# Patient Record
Sex: FEMALE | Race: WHITE | Employment: FULL TIME | URBAN - METROPOLITAN AREA
[De-identification: names, ages, dates, MRNs, and addresses within clinical notes are randomized per-mention and may not be internally consistent; named-entity substitution may affect disease eponyms.]

---

## 2022-10-10 LAB
C. TRACHOMATIS, EXTERNAL RESULT: NEGATIVE
N. GONORRHOEAE, EXTERNAL RESULT: NEGATIVE

## 2022-11-07 LAB
ABO, EXTERNAL RESULT: NORMAL
HEP B, EXTERNAL RESULT: NEGATIVE
HEPATITIS C ANTIBODY, EXTERNAL RESULT: NEGATIVE
HIV, EXTERNAL RESULT: NONREACTIVE
RH FACTOR, EXTERNAL RESULT: NEGATIVE
RPR, EXTERNAL RESULT: NON REACTIVE
RUBELLA TITER, EXTERNAL RESULT: NORMAL

## 2022-11-11 ENCOUNTER — APPOINTMENT (OUTPATIENT)
Dept: ULTRASOUND IMAGING | Age: 37
End: 2022-11-11
Payer: COMMERCIAL

## 2022-11-11 ENCOUNTER — HOSPITAL ENCOUNTER (EMERGENCY)
Age: 37
Discharge: HOME | End: 2022-11-12
Attending: EMERGENCY MEDICINE
Payer: COMMERCIAL

## 2022-11-11 DIAGNOSIS — B96.89 BACTERIAL VAGINOSIS IN PREGNANCY: ICD-10-CM

## 2022-11-11 DIAGNOSIS — O23.599 BACTERIAL VAGINOSIS IN PREGNANCY: ICD-10-CM

## 2022-11-11 DIAGNOSIS — Z29.13 NEED FOR RHOGAM DUE TO RH NEGATIVE MOTHER: ICD-10-CM

## 2022-11-11 DIAGNOSIS — O46.90 VAGINAL BLEEDING IN PREGNANCY: Primary | ICD-10-CM

## 2022-11-11 LAB
A/G RATIO: 1.4 (ref 1.1–2.2)
ABO/RH: NORMAL
ALBUMIN SERPL-MCNC: 4.2 G/DL (ref 3.4–5)
ALP BLD-CCNC: 65 U/L (ref 40–129)
ALT SERPL-CCNC: 8 U/L (ref 10–40)
ANION GAP SERPL CALCULATED.3IONS-SCNC: 13 MMOL/L (ref 3–16)
ANTIBODY SCREEN: NORMAL
AST SERPL-CCNC: 12 U/L (ref 15–37)
BACTERIA WET PREP: ABNORMAL
BACTERIA: ABNORMAL /HPF
BASOPHILS ABSOLUTE: 0.1 K/UL (ref 0–0.2)
BASOPHILS RELATIVE PERCENT: 0.3 %
BILIRUB SERPL-MCNC: <0.2 MG/DL (ref 0–1)
BILIRUBIN URINE: NEGATIVE
BLOOD, URINE: ABNORMAL
BUN BLDV-MCNC: 11 MG/DL (ref 7–20)
CALCIUM SERPL-MCNC: 9.4 MG/DL (ref 8.3–10.6)
CHLORIDE BLD-SCNC: 103 MMOL/L (ref 99–110)
CLARITY: CLEAR
CLUE CELLS: ABNORMAL
CO2: 22 MMOL/L (ref 21–32)
COLOR: YELLOW
CREAT SERPL-MCNC: <0.5 MG/DL (ref 0.6–1.1)
EOSINOPHILS ABSOLUTE: 0.1 K/UL (ref 0–0.6)
EOSINOPHILS RELATIVE PERCENT: 0.7 %
EPITHELIAL CELLS WET PREP: ABNORMAL
EPITHELIAL CELLS, UA: ABNORMAL /HPF (ref 0–5)
GFR SERPL CREATININE-BSD FRML MDRD: >60 ML/MIN/{1.73_M2}
GLUCOSE BLD-MCNC: 97 MG/DL (ref 70–99)
GLUCOSE URINE: NEGATIVE MG/DL
GONADOTROPIN, CHORIONIC (HCG) QUANT: NORMAL MIU/ML
HCT VFR BLD CALC: 38 % (ref 36–48)
HEMOGLOBIN: 12.8 G/DL (ref 12–16)
KETONES, URINE: ABNORMAL MG/DL
LEUKOCYTE ESTERASE, URINE: NEGATIVE
LYMPHOCYTES ABSOLUTE: 4.3 K/UL (ref 1–5.1)
LYMPHOCYTES RELATIVE PERCENT: 24.8 %
MCH RBC QN AUTO: 28.4 PG (ref 26–34)
MCHC RBC AUTO-ENTMCNC: 33.7 G/DL (ref 31–36)
MCV RBC AUTO: 84.3 FL (ref 80–100)
MICROSCOPIC EXAMINATION: YES
MONOCYTES ABSOLUTE: 0.6 K/UL (ref 0–1.3)
MONOCYTES RELATIVE PERCENT: 3.7 %
MUCUS: ABNORMAL /LPF
NEUTROPHILS ABSOLUTE: 12.3 K/UL (ref 1.7–7.7)
NEUTROPHILS RELATIVE PERCENT: 70.5 %
NITRITE, URINE: NEGATIVE
PDW BLD-RTO: 14.3 % (ref 12.4–15.4)
PH UA: 6 (ref 5–8)
PLATELET # BLD: 299 K/UL (ref 135–450)
PMV BLD AUTO: 7.7 FL (ref 5–10.5)
POTASSIUM REFLEX MAGNESIUM: 4.1 MMOL/L (ref 3.5–5.1)
PROTEIN UA: ABNORMAL MG/DL
RBC # BLD: 4.51 M/UL (ref 4–5.2)
RBC UA: ABNORMAL /HPF (ref 0–4)
RBC WET PREP: ABNORMAL
SODIUM BLD-SCNC: 138 MMOL/L (ref 136–145)
SOURCE WET PREP: ABNORMAL
SPECIFIC GRAVITY UA: >=1.03 (ref 1–1.03)
TOTAL PROTEIN: 7.2 G/DL (ref 6.4–8.2)
TRICHOMONAS PREP: ABNORMAL
URINE REFLEX TO CULTURE: ABNORMAL
URINE TYPE: ABNORMAL
UROBILINOGEN, URINE: 0.2 E.U./DL
WBC # BLD: 17.5 K/UL (ref 4–11)
WBC UA: ABNORMAL /HPF (ref 0–5)
WBC WET PREP: ABNORMAL
YEAST WET PREP: ABNORMAL

## 2022-11-11 PROCEDURE — 87210 SMEAR WET MOUNT SALINE/INK: CPT

## 2022-11-11 PROCEDURE — 80053 COMPREHEN METABOLIC PANEL: CPT

## 2022-11-11 PROCEDURE — 81001 URINALYSIS AUTO W/SCOPE: CPT

## 2022-11-11 PROCEDURE — 85025 COMPLETE CBC W/AUTO DIFF WBC: CPT

## 2022-11-11 PROCEDURE — 86901 BLOOD TYPING SEROLOGIC RH(D): CPT

## 2022-11-11 PROCEDURE — 87086 URINE CULTURE/COLONY COUNT: CPT

## 2022-11-11 PROCEDURE — 76817 TRANSVAGINAL US OBSTETRIC: CPT

## 2022-11-11 PROCEDURE — 36415 COLL VENOUS BLD VENIPUNCTURE: CPT

## 2022-11-11 PROCEDURE — 84702 CHORIONIC GONADOTROPIN TEST: CPT

## 2022-11-11 PROCEDURE — 86850 RBC ANTIBODY SCREEN: CPT

## 2022-11-11 PROCEDURE — 86900 BLOOD TYPING SEROLOGIC ABO: CPT

## 2022-11-11 PROCEDURE — 99284 EMERGENCY DEPT VISIT MOD MDM: CPT

## 2022-11-11 RX ORDER — METRONIDAZOLE 7.5 MG/G
GEL VAGINAL
Qty: 1 EACH | Refills: 0 | Status: SHIPPED | OUTPATIENT
Start: 2022-11-11 | End: 2022-11-18

## 2022-11-12 VITALS
BODY MASS INDEX: 30.61 KG/M2 | SYSTOLIC BLOOD PRESSURE: 103 MMHG | HEIGHT: 70 IN | RESPIRATION RATE: 16 BRPM | DIASTOLIC BLOOD PRESSURE: 70 MMHG | WEIGHT: 213.85 LBS | OXYGEN SATURATION: 99 % | HEART RATE: 60 BPM | TEMPERATURE: 97.7 F

## 2022-11-12 LAB — RHIG LOT NUMBER: NORMAL

## 2022-11-12 NOTE — DISCHARGE INSTRUCTIONS
The treatment for bacterial vaginosis was sent to the pharmacy: [facility], please go to the pharmacy to pick this up

## 2022-11-12 NOTE — ED PROVIDER NOTES
[address] [facility]      Pt Name: Heidi Rivera  MRN: [id number]  [place] 1985  Date of evaluation: 11/11/2022  Provider: Alonso Melendrez, [address] [place] [address]  Chief Complaint   Patient presents with    Vaginal Bleeding         This patient was turned over to me at [address] nurse practitioner. They were examined by me and I agree with the history and physical examination of  . After my evaluation of this patient, and review of the labs and imaging studies, they will be discharged. History reviewed. No pertinent past medical history. Vitals:    11/11/22 1912   BP: 116/73   Pulse: 75   Resp: 17   Temp: 98 °F (36.7 °C)   SpO2: 100%       MDM:  Heidi Rivera is a 44 y.o. female who presents with vaginal bleeding and is pregnant that has been present for few days. She had a ultrasound in this pregnancy that revealed an intrauterine pregnancy. She started bleeding recently. She was sent to this emergency department for RhoGAM due to her Rh- status and ultrasound. Physical exam reveals mild tenderness in the lower quadrants. Pelvic exam was performed by the nurse practitioner. Remainder of her counts were negative. Patient was given RhoGAM and a ABO and Rh were obtained because it was required by lab. Ultrasound revealed normal endometrial lining and normal uterus with no intrauterine pregnancy that was observed in the last ultrasound. Therefore, patient was discharged to follow with her obstetrician for further evaluation and treatment. She was treated for bacterial vaginosis. She was instructed return if any problems.     Vitals:    11/11/22 1912   BP: 116/73   Pulse: 75   Resp: 17   Temp: 98 °F (36.7 °C)   SpO2: 100%       Labs Reviewed   WET PREP, GENITAL - Abnormal; Notable for the following components:       Result Value    Clue Cells, Wet Prep 2+ (*)     All other components within normal limits   COMPREHENSIVE METABOLIC PANEL W/ REFLEX TO MG FOR LOW K - Abnormal; Notable for the following components:    Creatinine <0.5 (*)     ALT 8 (*)     AST 12 (*)     All other components within normal limits   CBC WITH AUTO DIFFERENTIAL - Abnormal; Notable for the following components:    WBC 17.5 (*)     Neutrophils Absolute 12.3 (*)     All other components within normal limits   URINALYSIS WITH REFLEX TO CULTURE - Abnormal; Notable for the following components:    Ketones, Urine TRACE (*)     Blood, Urine LARGE (*)     Protein, UA TRACE (*)     All other components within normal limits   MICROSCOPIC URINALYSIS - Abnormal; Notable for the following components:    Mucus, UA 1+ (*)     RBC, UA 5-10 (*)     Epithelial Cells, UA 6-10 (*)     Bacteria, UA 1+ (*)     All other components within normal limits   CULTURE, URINE   HCG, QUANTITATIVE, PREGNANCY   ABO/RH   ANTIBODY SCREEN   RHOGAM INJECTION ONLY       US OB TRANSVAGINAL    (Results Pending)         Pertinent Labs & Imaging studies reviewed. (See chart for details)    Patient remained stable in the ED. she was discharged to follow with her gynecologist and return to emergency department for any problems. The patient's blood pressure was not found to be elevated according to CMS/Medicare and the Affordable Care Act/ObamaCare criteria. See discharge instructions for specific medications, discharge information, and treatments. They were verbally instructed to return to emergency if any problems. Medications - No data to display    New Prescriptions    METRONIDAZOLE (METROGEL VAGINAL) 0.75 % VAGINAL GEL    Apply 1 applicator (5 g) intravaginally every night for 7 nights. (This chart has been completed using [address]. Although attempts have been made to ensure accuracy, words and/or phrases may not be transcribed as intended.)    Patient refused pain medicines at the time of their exam.    IMPRESSION(S):  1. Vaginal bleeding in pregnancy    2. Need for rhogam due to Rh negative mother    3.  Bacterial vaginosis in pregnancy             Ricki Bocanegra DO  11/11/22 3984

## 2022-11-12 NOTE — ED PROVIDER NOTES
[facility] [address]  Dept: [phone]  Loc: [phone]    EMERGENCY DEPARTMENT ENCOUNTER        This patient was not seen or evaluated by the attending physician at [address] [place] [address]. Will be seen and evaluated at [facility] by physician there. CHIEF COMPLAINT    Chief Complaint   Patient presents with    Vaginal Bleeding       HPI    Lou Adventist is a 44 y.o. female  Ab2 who presents with vaginal bleeding. Onset was today. Has only concerns for possible blood, she saw some spots on the toilet paper when she wiped and thinks that she Mitosol some blood in her urine. No dysuria. Does not even need a pad or tampon, just spotting. She is having some mild lower pelvic cramping. She has had multiple IUP is confirmed on ultrasound, sees [address] at [facility].  She is approximately 11 weeks pregnant. The patient's pain is extremely mild in nature. No pain currently. No nausea or vomiting. Came to the ED for further evaluation and treatment    REVIEW OF SYSTEMS    General: No fevers or chills  : No dysuria or flank pain  GI: No vomiting or diarrhea  Pulmonary: No difficulty breathing or cough  Neurologic: No loss of consciousness or syncope  See HPI for further details. All other systems reviewed and are negative. PAST MEDICAL & SURGICAL HISTORY    History reviewed. No pertinent past medical history. Past Surgical History:   Procedure Laterality Date    ABDOMEN SURGERY         CURRENT MEDICATIONS        ALLERGIES    Allergies   Allergen Reactions    Ciprofloxacin Itching    Penicillins Itching       FAMILY AND SOCIAL HISTORY    History reviewed. No pertinent family history.   Social History     Socioeconomic History    Marital status:      Spouse name: None    Number of children: None    Years of education: None    Highest education level: None   Tobacco Use    Smoking status: Never    Smokeless tobacco: Never   Substance and Sexual Activity    Alcohol use: Not Currently    Drug use: Never       PHYSICAL EXAM    VITAL SIGNS: /73   Pulse 75   Temp 98 °F (36.7 °C) (Oral)   Resp 17   Ht 5' 10\" (1.778 m)   Wt 213 lb 13.5 oz (97 kg)   SpO2 100%   BMI 30.68 kg/m²    Constitutional:  Well-developed, well-nourished, appears comfortable  Eyes:  Non-icteric sclera, no conjunctival injection   HENT:  Atraumatic, external nose normal.   NECK: Supple, no JVD   Respiratory:  No respiratory distress, normal breath sounds   Cardiovascular:  regular rate, no murmurs  GI:  Soft, no abdominal tenderness, bowel sounds unremarkable   : Pelvic exam performed with chaperone reveals: Normally developed external genitalia with no cutaneous or mucosal lesions or vesicles, normal appearing cervix, have a scant amount of bright red blood noted in the vaginal vault, no overt products of conception, did have 2 small, less than 0.5 cm in diameter blood clots in the vaginal vault. No active bleeding. Cervical os was closed. No cervical motion tenderness, no adnexal tenderness   Musculoskeletal: No edema, no deformities  Integument:  Nondiaphoretic skin, no obvious rashes  Neurologic: Awake and oriented, no slurred speech    RADIOLOGY/PROCEDURES    No orders to display       ED COURSE & MEDICAL DECISION MAKING    Pertinent Labs & Imaging studies reviewed and interpreted. (See chart for details)  See chart for details of medications given during the ED stay.     Vitals:    11/11/22 1912   BP: 116/73   Pulse: 75   Resp: 17   Temp: 98 °F (36.7 °C)   TempSrc: Oral   SpO2: 100%   Weight: 213 lb 13.5 oz (97 kg)   Height: 5' 10\" (1.778 m)       Differential diagnosis: ectopic pregnancy, molar pregnancy, miscarriage, hemorrhagic Shock, Rh incompatibility, UTI, placenta previa, other    CRITICAL CARE NOTE:  There was a high probability of clinically significant life-threatening deterioration of the patient's condition requiring my urgent intervention. Total critical care time was at least 15 minutes. This includes vital sign monitoring, pulse oximetry monitoring, telemetry monitoring, clinical response to the IV medications, reviewing the nursing notes, consultation time, dictation/documentation time, and interpretation of the labwork. This excludes any separately billable procedures performed. Patient is afebrile and nontoxic in appearance. Labs reveal leukocytosis of 17.5. Uncertain why this is elevated, she has been elevated before in the past as high as 15.7. She has no fever, no tachycardia or signs of infection. Wet prep was positive for 2+ clue cells consistent with BV. No trichomonas or yeast noted. I will E-scribed the MetroGel to the [address] on [facility] that we have listed for the patient    Her urine shows no signs of nitrite or leukocytes, the micro UA is still pending, and can she can follow-up the results when she gets to [facility] emergency department on the micro UA. She will need to follow-up at [facility] emergency department for the quantitative results, this is still pending at the time of her transfer as well. I explained to the patient that she will need a repeat quantitative done on Monday morning, was given the prescription for the repeat quantitative and will need to follow-up with her OB/GYN on the repeat quantitative test result. Metabolic panel unremarkable. Ultrasound is unavailable here at [address] [place] [address] as we are a freestanding ED. However she has had multiple confirmed IUP ultrasounds with her OB/GYN. I do not believe that a repeat emergent ultrasound is warranted as she has had confirmed IUP. Patient is Rh negative, seen per previous blood work on care everywhere. The ABO Rh is a send out. She will have to go to [facility] to obtain the RhoGAM and confirmation of the ABO Rh status.     Given that the patient will need to be transferred to [facility] for the RhoGAM I did call and expedite care for the patient. I spoke to Dr. Lorena Wood who accepted the patient for transfer. Patient will be transferred via private vehicle as she is hemodynamically stable. Instructed to go straight to [facility] emergency department. And she verbalized understanding of this. FINAL IMPRESSION    1. Vaginal bleeding in pregnancy    2. Need for rhogam due to Rh negative mother    3.  Bacterial vaginosis in pregnancy        PLAN  Transfer to [facility] for RhoGAM administration    (Please note that this note was completed with a voice recognition program.  Every attempt was made to edit the dictations, but inevitably there remain words that are mis-transcribed.)         DAYANA Young CNP  11/11/22 2001       DAYANA Young CNP  11/11/22 2017

## 2022-11-12 NOTE — ED NOTES
Report to [facility].  Patient to go over to [facility] via private car, instructed not to eat or drink anything prior to arrival.      Theodora Archer RN  11/11/22 1956

## 2022-11-13 LAB — URINE CULTURE, ROUTINE: NORMAL

## 2022-11-13 NOTE — ED PROVIDER NOTES
Emergency Department Encounter  Location: [address] [place] [address]    Patient: Ileana Lema  MRN: [id number]  : 1985  Date of evaluation: 2022  ED Provider: Michelle Murdock MD      Ileana Lema was initially evaluated by Ms. Álvarez and Dr. Pham Morales. Please see his/her initial documentation for details of the patient's initial ED presentation, physical exam and completed studies. In brief, Ileana Lema is a 44 y.o. female that presented to the emergency department for vaginal bleeding in pregnancy, workup and evaluation as below and documented by the prior providers. Called to the pt's room to discuss imaging findings by nursing.       I have reviewed and interpreted all of the currently available lab results and diagnostics from this visit:  Results for orders placed or performed during the hospital encounter of 22   Wet prep, genital    Specimen: Vaginal   Result Value Ref Range    Trichomonas Prep None Seen     Yeast, Wet Prep None Seen     Clue Cells, Wet Prep 2+ (A)     WBC, Wet Prep 1+     RBC, Wet Prep 3+     Epi Cells 2+     Bacteria 4+     Source Wet Prep Vaginal    CMP w/ Reflex to MG   Result Value Ref Range    Sodium 138 136 - 145 mmol/L    Potassium reflex Magnesium 4.1 3.5 - 5.1 mmol/L    Chloride 103 99 - 110 mmol/L    CO2 22 21 - 32 mmol/L    Anion Gap 13 3 - 16    Glucose 97 70 - 99 mg/dL    BUN 11 7 - 20 mg/dL    Creatinine <0.5 (L) 0.6 - 1.1 mg/dL    Est, Glom Filt Rate >60 >60    Calcium 9.4 8.3 - 10.6 mg/dL    Total Protein 7.2 6.4 - 8.2 g/dL    Albumin 4.2 3.4 - 5.0 g/dL    Albumin/Globulin Ratio 1.4 1.1 - 2.2    Total Bilirubin <0.2 0.0 - 1.0 mg/dL    Alkaline Phosphatase 65 40 - 129 U/L    ALT 8 (L) 10 - 40 U/L    AST 12 (L) 15 - 37 U/L   CBC with Auto Differential   Result Value Ref Range    WBC 17.5 (H) 4.0 - 11.0 K/uL    RBC 4.51 4.00 - 5.20 M/uL    Hemoglobin 12.8 12.0 - 16.0 g/dL    Hematocrit 38.0 36.0 - 48.0 %    MCV 84.3 80.0 - 100.0 fL MCH 28.4 26.0 - 34.0 pg    MCHC 33.7 31.0 - 36.0 g/dL    RDW 14.3 12.4 - 15.4 %    Platelets 492 168 - 244 K/uL    MPV 7.7 5.0 - 10.5 fL    Neutrophils % 70.5 %    Lymphocytes % 24.8 %    Monocytes % 3.7 %    Eosinophils % 0.7 %    Basophils % 0.3 %    Neutrophils Absolute 12.3 (H) 1.7 - 7.7 K/uL    Lymphocytes Absolute 4.3 1.0 - 5.1 K/uL    Monocytes Absolute 0.6 0.0 - 1.3 K/uL    Eosinophils Absolute 0.1 0.0 - 0.6 K/uL    Basophils Absolute 0.1 0.0 - 0.2 K/uL   Urinalysis with Reflex to Culture    Specimen: Urine   Result Value Ref Range    Color, UA Yellow Straw/Yellow    Clarity, UA Clear Clear    Glucose, Ur Negative Negative mg/dL    Bilirubin Urine Negative Negative    Ketones, Urine TRACE (A) Negative mg/dL    Specific Gravity, UA >=1.030 1.005 - 1.030    Blood, Urine LARGE (A) Negative    pH, UA 6.0 5.0 - 8.0    Protein, UA TRACE (A) Negative mg/dL    Urobilinogen, Urine 0.2 <2.0 E.U./dL    Nitrite, Urine Negative Negative    Leukocyte Esterase, Urine Negative Negative    Microscopic Examination YES     Urine Type NotGiven     Urine Reflex to Culture Not Indicated    hCG, quantitative, pregnancy   Result Value Ref Range    hCG Quant 28185.1 <5.0 mIU/mL   Microscopic Urinalysis   Result Value Ref Range    Mucus, UA 1+ (A) None Seen /LPF    WBC, UA 3-5 0 - 5 /HPF    RBC, UA 5-10 (A) 0 - 4 /HPF    Epithelial Cells, UA 6-10 (A) 0 - 5 /HPF    Bacteria, UA 1+ (A) None Seen /HPF   RHOGAM INJECTION ONLY   Result Value Ref Range    RHIG LOT NUMBER       RhImmuneGlobulin    [id number]          issued       1 vial  11/12/22 11:15   ABO/RH   Result Value Ref Range    ABO/Rh A NEG    ANTIBODY SCREEN   Result Value Ref Range    Antibody Screen NEG      US OB TRANSVAGINAL    Result Date: 11/11/2022  EXAMINATION: FIRST TRIMESTER OBSTETRIC ULTRASOUND 11/11/2022 TECHNIQUE: Transvaginal first trimester obstetric pelvic duplex ultrasound was performed with real-time imaging, color flow Doppler imaging, and spectral analysis. COMPARISON: None HISTORY: ORDERING SYSTEM PROVIDED HISTORY: Pregnant and Pelvic Pain TECHNOLOGIST PROVIDED HISTORY: Reason for exam:->Pregnant and Pelvic Pain FINDINGS: Uterus: The uterus measures 12.9 x 9.1 x 7.4 cm. No focal myometrial abnormality. A few nabothian cysts are present in the cervix. Gestational Sac(s):  Single normal appearing gestational sac. No evidence of subchorionic hemorrhage. Yolk Sac:  Not Seen Fetal Pole:  Single fetal pole Crown Rump Length:  4.4 cm Fetal Heart Rate:  157 beats per minute Right ovary: The right ovary was not seen. Left ovary: The left ovary was not seen. Free fluid: No pelvic free fluid identified. Measurements: Estimated gestational age by current ultrasound: 5 weeks, 2 days Estimated gestational by LMP/prior ultrasound: 10 weeks, 6 days Estimated Due Date: 05/31/2023     Single live intrauterine gestation with an estimated sonographic age of 5 weeks, 2 days. No acute intrapelvic findings. Nonvisualization of the ovaries. Final ED Course and MDM: In brief, Claudeen Blonder is a 44 y.o. female whose care was signed out to me by the outgoing provider. In brief, I was asked by nursing to discuss with the pt her actual US results as she had been inadvertently given results of an ultrasound she had from 2021 by the prior team.  The wrong report was faxed over during downtime. I personally reviewed the patient's images and visualized a live IUP. Radiology then faxed over the correct report which corroborated my interpretation as well. Pt was understandably upset about the initial report that she was not pregnant. I provided an apology and relayed to her the reassuring actual US findings from today. She was relieved. Os was closed per pelvic performed earlier. I did indicate that 1st trimester bleeding is common but is associated with some increase in miscarriage risk and recommended pelvic rest.  Pt voiced understanding.   Bleeding nearly resolved currently, pt without pain, tolerating PO.  HDS. Stable for and amenable to d/c home. Given RhoGam as she is Rh negative. Given d/c instructions and return precautions, pt voices understanding. D/c home, ambulated steadily from the ED. I, Dr. Antonieta Quintanilla, am the primary clinician of record on the case. Final Impression      1. Vaginal bleeding in pregnancy    2. Need for rhogam due to Rh negative mother    3.  Bacterial vaginosis in pregnancy        DISPOSITION Decision To Discharge 11/12/2022 01:15:54 AM     (Please note that portions of this note may have been completed with a voice recognition program. Efforts were made to edit the dictations but occasionally words are mis-transcribed.)    Corey Augustin MD  [facility] MD Yenni  11/12/22 3454

## 2022-11-17 ENCOUNTER — HOSPITAL ENCOUNTER (EMERGENCY)
Age: 37
Discharge: HOME | End: 2022-11-17
Payer: COMMERCIAL

## 2022-11-17 VITALS
RESPIRATION RATE: 16 BRPM | OXYGEN SATURATION: 100 % | DIASTOLIC BLOOD PRESSURE: 78 MMHG | HEIGHT: 70 IN | TEMPERATURE: 98.9 F | SYSTOLIC BLOOD PRESSURE: 116 MMHG | BODY MASS INDEX: 30.61 KG/M2 | WEIGHT: 213.85 LBS | HEART RATE: 74 BPM

## 2022-11-17 DIAGNOSIS — O46.90 VAGINAL BLEEDING IN PREGNANCY: Primary | ICD-10-CM

## 2022-11-17 LAB
ANION GAP SERPL CALCULATED.3IONS-SCNC: 10 MMOL/L (ref 3–16)
BASOPHILS ABSOLUTE: 0.1 K/UL (ref 0–0.2)
BASOPHILS RELATIVE PERCENT: 0.5 %
BUN BLDV-MCNC: 9 MG/DL (ref 7–20)
CALCIUM SERPL-MCNC: 9.1 MG/DL (ref 8.3–10.6)
CHLORIDE BLD-SCNC: 105 MMOL/L (ref 99–110)
CO2: 22 MMOL/L (ref 21–32)
CREAT SERPL-MCNC: 0.6 MG/DL (ref 0.6–1.1)
EOSINOPHILS ABSOLUTE: 0.1 K/UL (ref 0–0.6)
EOSINOPHILS RELATIVE PERCENT: 0.9 %
GFR SERPL CREATININE-BSD FRML MDRD: >60 ML/MIN/{1.73_M2}
GLUCOSE BLD-MCNC: 110 MG/DL (ref 70–99)
GONADOTROPIN, CHORIONIC (HCG) QUANT: NORMAL MIU/ML
HCT VFR BLD CALC: 39.2 % (ref 36–48)
HEMOGLOBIN: 12.6 G/DL (ref 12–16)
LYMPHOCYTES ABSOLUTE: 2.9 K/UL (ref 1–5.1)
LYMPHOCYTES RELATIVE PERCENT: 23.7 %
MCH RBC QN AUTO: 27.7 PG (ref 26–34)
MCHC RBC AUTO-ENTMCNC: 32.1 G/DL (ref 31–36)
MCV RBC AUTO: 86.5 FL (ref 80–100)
MONOCYTES ABSOLUTE: 0.6 K/UL (ref 0–1.3)
MONOCYTES RELATIVE PERCENT: 4.9 %
NEUTROPHILS ABSOLUTE: 8.5 K/UL (ref 1.7–7.7)
NEUTROPHILS RELATIVE PERCENT: 70 %
PDW BLD-RTO: 14.2 % (ref 12.4–15.4)
PLATELET # BLD: 290 K/UL (ref 135–450)
PMV BLD AUTO: 7.7 FL (ref 5–10.5)
POTASSIUM REFLEX MAGNESIUM: 4.3 MMOL/L (ref 3.5–5.1)
RBC # BLD: 4.53 M/UL (ref 4–5.2)
SODIUM BLD-SCNC: 137 MMOL/L (ref 136–145)
WBC # BLD: 12.1 K/UL (ref 4–11)

## 2022-11-17 PROCEDURE — 36415 COLL VENOUS BLD VENIPUNCTURE: CPT

## 2022-11-17 PROCEDURE — 99283 EMERGENCY DEPT VISIT LOW MDM: CPT

## 2022-11-17 PROCEDURE — 88305 TISSUE EXAM BY PATHOLOGIST: CPT

## 2022-11-17 PROCEDURE — 85025 COMPLETE CBC W/AUTO DIFF WBC: CPT

## 2022-11-17 PROCEDURE — 84702 CHORIONIC GONADOTROPIN TEST: CPT

## 2022-11-17 PROCEDURE — 80048 BASIC METABOLIC PNL TOTAL CA: CPT

## 2022-11-17 ASSESSMENT — ENCOUNTER SYMPTOMS
SHORTNESS OF BREATH: 0
ABDOMINAL PAIN: 1
COLOR CHANGE: 0

## 2022-11-17 ASSESSMENT — PAIN - FUNCTIONAL ASSESSMENT: PAIN_FUNCTIONAL_ASSESSMENT: NONE - DENIES PAIN

## 2022-11-17 ASSESSMENT — LIFESTYLE VARIABLES: HOW OFTEN DO YOU HAVE A DRINK CONTAINING ALCOHOL: NEVER

## 2022-11-17 NOTE — ED NOTES
Fetal heart tones found, [facility] [address] [facility], RN  11/17/22 126

## 2022-11-17 NOTE — ED PROVIDER NOTES
Patient     [address] [facility]      Pt Name: Alok Wilder  MRN: [id number]  [place] 1985  Date of evaluation: 2022  Provider: NATALI Valenzuela    This patient was not seen and evaluated by the attending physician No att. providers found. CHIEF COMPLAINT       Chief Complaint   Patient presents with    Vaginal Bleeding     Witth pregnacy       CRITICAL CARE TIME   I performed a total Critical Care time of 15 minutes, excluding separately reportable procedures. There was a high probability of clinically significant/life threatening deterioration in the patient's condition which required my urgent intervention. Not limited to multiple reexaminations, discussions with attending physician and consultants. HISTORY OF PRESENT ILLNESS  (Location/Symptom, Timing/Onset, Context/Setting, Quality, Duration, Modifying Factors, Severity.)   Alok Wilder is a 44 y.o. female who presents to the emergency department accompanied by her  complaining of vaginal bleeding in pregnancy. She was seen in our emergency department 5-6 days ago and had some spotting at that time she tells me that it stopped and Monday she was seen by her and at 8year-old and had fetal heart tones. She states that the bleeding started back just prior to arrival after having intercourse. She tells me that it was \"pouring out of her\" and that she noticed clots. She is  with last menstrual period 827 approximately 12 weeks pregnant. She complains of some mild lower abdominal cramping. Nursing Notes were reviewed and I agree. REVIEW OF SYSTEMS    (2-9 systems for level 4, 10 or more for level 5)     Review of Systems   Constitutional:  Negative for fever. Respiratory:  Negative for shortness of breath. Cardiovascular:  Negative for chest pain. Gastrointestinal:  Positive for abdominal pain. Genitourinary:  Positive for vaginal bleeding.    Skin: Negative for color change, rash and wound. Neurological:  Negative for weakness. Psychiatric/Behavioral:  Negative for agitation, behavioral problems and confusion. Except as noted above the remainder of the review of systems was reviewed and negative. PAST MEDICAL HISTORY   History reviewed. No pertinent past medical history. SURGICAL HISTORY           Procedure Laterality Date    ABDOMEN SURGERY         CURRENT MEDICATIONS       Discharge Medication List as of 11/17/2022  3:17 AM        CONTINUE these medications which have NOT CHANGED    Details   metroNIDAZOLE (METROGEL VAGINAL) 0.75 % vaginal gel Apply 1 applicator (5 g) intravaginally every night for 7 nights., Disp-1 each, R-0, Normal             ALLERGIES     Ciprofloxacin and Penicillins    FAMILY HISTORY     History reviewed. No pertinent family history. No family status information on file. SOCIAL HISTORY      reports that she has never smoked. She has never used smokeless tobacco. She reports that she does not currently use alcohol. She reports that she does not use drugs. PHYSICAL EXAM    (up to 7 for level 4, 8 or more for level 5)     ED Triage Vitals [11/17/22 0115]   BP Temp Temp Source Heart Rate Resp SpO2 Height Weight   116/78 98.9 °F (37.2 °C) Oral 74 18 100 % 5' 10\" (1.778 m) 213 lb 13.5 oz (97 kg)       Physical Exam  Vitals and nursing note reviewed. Constitutional:       Appearance: Normal appearance. HENT:      Head: Normocephalic and atraumatic. Eyes:      Pupils: Pupils are equal, round, and reactive to light. Cardiovascular:      Rate and Rhythm: Normal rate. Pulmonary:      Effort: Pulmonary effort is normal. No respiratory distress. Abdominal:      Tenderness: There is no abdominal tenderness. There is no guarding. Genitourinary:     Comments: External genitalia reveals no swelling, erythema, or external lesions. No rash or ulcer noted. Speculum was inserted without difficulty.   Large blood clot in the vaginal vault, cervical os appears closed. Scant blood otherwise. Performed with chaperone. Musculoskeletal:         General: Normal range of motion. Cervical back: Normal range of motion. Skin:     General: Skin is warm. Neurological:      General: No focal deficit present. Mental Status: She is alert and oriented to person, place, and time. Psychiatric:         Mood and Affect: Mood normal.         Behavior: Behavior normal.       DIAGNOSTIC RESULTS     NONE    LABS:  Labs Reviewed   CBC WITH AUTO DIFFERENTIAL - Abnormal; Notable for the following components:       Result Value    WBC 12.1 (*)     Neutrophils Absolute 8.5 (*)     All other components within normal limits   BASIC METABOLIC PANEL W/ REFLEX TO MG FOR LOW K - Abnormal; Notable for the following components:    Glucose 110 (*)     All other components within normal limits   HCG, QUANTITATIVE, PREGNANCY   SURGICAL PATHOLOGY       All other labs were within normal range or not returned as of this dictation. EMERGENCY DEPARTMENT COURSE and DIFFERENTIAL DIAGNOSIS/MDM:   Vitals:    Vitals:    11/17/22 0115 11/17/22 0128   BP: 116/78 116/78   Pulse: 74 74   Resp: 18 16   Temp: 98.9 °F (37.2 °C) 98.9 °F (37.2 °C)   TempSrc: Oral Oral   SpO2: 100% 100%   Weight: 213 lb 13.5 oz (97 kg) 213 lb 13.5 oz (97 kg)   Height: 5' 10\" (1.778 m) 5' 10\" (1.778 m)     I discussed with Michelle Salazar and/or family the exam results, diagnosis, care, prognosis, reasons to return and the importance of follow up. Patient and/or family is in full agreement with plan and all questions have been answered. Specific discharge instructions explained, including reasons to return to the emergency department. Michelle Salazar is well appearing, non-toxic, and afebrile at the time of discharge. Patient does have positive fetal heart tones at 159. Cervical os appears closed.   There was a large clot in the vaginal vault which removed and then there was very scant of blood. Her hemoglobin is stable at 12.6 previously 12.8 last week. She is going to follow-up with her OB/GYN tomorrow pelvic rest.  Return for new, worsening or other concerns. She had ultrasound confirming intrauterine pregnancy last week and received RhoGAM at that time. I estimate there is LOW risk for ACUTE APPENDICITIS, BOWEL OBSTRUCTION, CHOLECYSTITIS, DIVERTICULITIS, INCARCERATED HERNIA, PANCREATITIS, PELVIC INFLAMMATORY DISEASE, OVARIAN TORSION, PERFORATED BOWEL,  BOWEL ISCHEMIA, CARDIAC ISCHEMIA, ECTOPIC PREGNANCY, or TUBO-OVARIAN ABSCESS, thus I consider the discharge disposition reasonable. Also, there is no evidence or peritonitis, sepsis, or toxicity. CONSULTS:  None    PROCEDURES:  Procedures      FINAL IMPRESSION      1.  Vaginal bleeding in pregnancy          DISPOSITION/PLAN   DISPOSITION Decision To Discharge 11/17/2022 03:14:36 AM      PATIENT REFERRED TO:  Connor Hunter MD  [address] [place] [address] [address] [place] [address]  [phone]    Call in 1 day  For follow up      [address]:  Discharge Medication List as of 11/17/2022  3:17 AM          (Please note that portions of this note were completed with a voice recognition program.  Efforts were made to edit the dictations but occasionally words are mis-transcribed.)    Darra Fothergill, [place], [address]  11/17/22 1986

## 2022-11-17 NOTE — LETTER
[facility] Emergency Department  [address] [address]  Phone: [phone]               November 17, 2022    To Whom It May Concern:    Mr. Conchis Maynard was in our emergency department on 11/17/2022. He may return to work on 11/18/22.       Sincerely,       NATALI Fuentes         Signature:__________________________________

## 2022-11-17 NOTE — ED TRIAGE NOTES
Spotting on Friday came and had ultrasound then went to  On Monday and heard baby's heartbeat then tonight started gushing blood

## 2023-02-19 ENCOUNTER — HOSPITAL ENCOUNTER (EMERGENCY)
Age: 38
Discharge: HOME | End: 2023-02-19
Attending: EMERGENCY MEDICINE
Payer: COMMERCIAL

## 2023-02-19 VITALS
OXYGEN SATURATION: 100 % | TEMPERATURE: 97 F | RESPIRATION RATE: 16 BRPM | BODY MASS INDEX: 33.27 KG/M2 | HEART RATE: 80 BPM | SYSTOLIC BLOOD PRESSURE: 124 MMHG | WEIGHT: 232.37 LBS | DIASTOLIC BLOOD PRESSURE: 86 MMHG | HEIGHT: 70 IN

## 2023-02-19 DIAGNOSIS — R07.89 CHEST WALL PAIN: Primary | ICD-10-CM

## 2023-02-19 LAB
A/G RATIO: 1.1 (ref 1.1–2.2)
ALBUMIN SERPL-MCNC: 3.3 G/DL (ref 3.4–5)
ALP BLD-CCNC: 59 U/L (ref 40–129)
ALT SERPL-CCNC: 7 U/L (ref 10–40)
ANION GAP SERPL CALCULATED.3IONS-SCNC: 13 MMOL/L (ref 3–16)
AST SERPL-CCNC: 10 U/L (ref 15–37)
BASOPHILS ABSOLUTE: 0.1 K/UL (ref 0–0.2)
BASOPHILS RELATIVE PERCENT: 0.3 %
BILIRUB SERPL-MCNC: <0.2 MG/DL (ref 0–1)
BUN BLDV-MCNC: 8 MG/DL (ref 7–20)
CALCIUM SERPL-MCNC: 8.8 MG/DL (ref 8.3–10.6)
CHLORIDE BLD-SCNC: 102 MMOL/L (ref 99–110)
CO2: 20 MMOL/L (ref 21–32)
CREAT SERPL-MCNC: <0.5 MG/DL (ref 0.6–1.1)
EOSINOPHILS ABSOLUTE: 0.2 K/UL (ref 0–0.6)
EOSINOPHILS RELATIVE PERCENT: 0.9 %
GFR SERPL CREATININE-BSD FRML MDRD: >60 ML/MIN/{1.73_M2}
GLUCOSE BLD-MCNC: 97 MG/DL (ref 70–99)
HCT VFR BLD CALC: 34.4 % (ref 36–48)
HEMOGLOBIN: 11.5 G/DL (ref 12–16)
LYMPHOCYTES ABSOLUTE: 4.1 K/UL (ref 1–5.1)
LYMPHOCYTES RELATIVE PERCENT: 23.5 %
MCH RBC QN AUTO: 28.2 PG (ref 26–34)
MCHC RBC AUTO-ENTMCNC: 33.6 G/DL (ref 31–36)
MCV RBC AUTO: 84.1 FL (ref 80–100)
MONOCYTES ABSOLUTE: 0.8 K/UL (ref 0–1.3)
MONOCYTES RELATIVE PERCENT: 4.3 %
NEUTROPHILS ABSOLUTE: 12.4 K/UL (ref 1.7–7.7)
NEUTROPHILS RELATIVE PERCENT: 71 %
PDW BLD-RTO: 13.4 % (ref 12.4–15.4)
PLATELET # BLD: 325 K/UL (ref 135–450)
PMV BLD AUTO: 8.3 FL (ref 5–10.5)
POTASSIUM REFLEX MAGNESIUM: 3.8 MMOL/L (ref 3.5–5.1)
RAPID INFLUENZA  B AGN: NEGATIVE
RAPID INFLUENZA A AGN: NEGATIVE
RBC # BLD: 4.09 M/UL (ref 4–5.2)
S PYO AG THROAT QL: NEGATIVE
SARS-COV-2, NAAT: NOT DETECTED
SODIUM BLD-SCNC: 135 MMOL/L (ref 136–145)
TOTAL PROTEIN: 6.3 G/DL (ref 6.4–8.2)
TROPONIN: <0.01 NG/ML
WBC # BLD: 17.5 K/UL (ref 4–11)

## 2023-02-19 PROCEDURE — 87804 INFLUENZA ASSAY W/OPTIC: CPT

## 2023-02-19 PROCEDURE — 87635 SARS-COV-2 COVID-19 AMP PRB: CPT

## 2023-02-19 PROCEDURE — 80053 COMPREHEN METABOLIC PANEL: CPT

## 2023-02-19 PROCEDURE — 85025 COMPLETE CBC W/AUTO DIFF WBC: CPT

## 2023-02-19 PROCEDURE — 87880 STREP A ASSAY W/OPTIC: CPT

## 2023-02-19 PROCEDURE — 99284 EMERGENCY DEPT VISIT MOD MDM: CPT

## 2023-02-19 PROCEDURE — 87081 CULTURE SCREEN ONLY: CPT

## 2023-02-19 PROCEDURE — 84484 ASSAY OF TROPONIN QUANT: CPT

## 2023-02-19 PROCEDURE — 2580000003 HC RX 258: Performed by: EMERGENCY MEDICINE

## 2023-02-19 RX ORDER — 0.9 % SODIUM CHLORIDE 0.9 %
1000 INTRAVENOUS SOLUTION INTRAVENOUS ONCE
Status: COMPLETED | OUTPATIENT
Start: 2023-02-19 | End: 2023-02-19

## 2023-02-19 RX ADMIN — SODIUM CHLORIDE 1000 ML: 9 INJECTION, SOLUTION INTRAVENOUS at 01:34

## 2023-02-19 ASSESSMENT — PAIN DESCRIPTION - LOCATION: LOCATION: CHEST;NECK

## 2023-02-19 ASSESSMENT — PAIN DESCRIPTION - ONSET: ONSET: SUDDEN

## 2023-02-19 ASSESSMENT — PAIN - FUNCTIONAL ASSESSMENT
PAIN_FUNCTIONAL_ASSESSMENT: 0-10
PAIN_FUNCTIONAL_ASSESSMENT: 0-10

## 2023-02-19 ASSESSMENT — PAIN DESCRIPTION - PAIN TYPE: TYPE: ACUTE PAIN

## 2023-02-19 ASSESSMENT — PAIN DESCRIPTION - FREQUENCY: FREQUENCY: CONTINUOUS

## 2023-02-19 ASSESSMENT — PAIN DESCRIPTION - DESCRIPTORS: DESCRIPTORS: CRUSHING;HEAVINESS

## 2023-02-19 ASSESSMENT — PAIN SCALES - GENERAL
PAINLEVEL_OUTOF10: 8
PAINLEVEL_OUTOF10: 4

## 2023-02-19 NOTE — ED TRIAGE NOTES
Mushtaq Durán is a 44 y.o. female   Brought to the ED by her , reports chest and neck pain when breathing. Sudden onset after eating shrimp for dinner. Pt is 26 weeks pregnant. Airway patent.

## 2023-02-19 NOTE — ED PROVIDER NOTES
[facility] EMERGENCY DEPARTMENT  EMERGENCY DEPARTMENT ENCOUNTER      Pt Name: Priya Lindquist  MRN: [id number]  Birthdate 1985  Date of evaluation: 2/19/2023  Provider: JESS MARINO DO    CHIEF COMPLAINT  Chief Complaint   Patient presents with    Chest Pain     Pt is experiencing chest pain with sudden onset after eating shrimp for dinner. Pt is 7 months.          This patient is at risk for a communicable infection.  Therefore, personal protection equipment consisting of a mask was worn for the exam.    HPI  Priya Lindquist is a 37 y.o. female who presents with Complaining chest pain after eating shrimp for dinner.  Patient is 26 weeks pregnant.  She denies any fevers or chills.  She denies any nausea or vomiting.  Nothing makes it better or worse.  She describes it as moderate.  She denies any radiation of pain.  She states it radiates into her neck.  She states she has eaten shrimp and shellfish in the past without any problems.  ?  REVIEW OF SYSTEMS  All systems negative except as noted in the HPI.  Reviewed Nurses' notes and concur.    Patient's last menstrual period was 08/27/2022 (exact date).    PAST MEDICAL HISTORY  No past medical history on file.    FAMILY HISTORY  No family history on file.    SOCIAL HISTORY   reports that she has never smoked. She has never used smokeless tobacco. She reports that she does not currently use alcohol. She reports that she does not use drugs.    SURGICAL HISTORY  Past Surgical History:   Procedure Laterality Date    ABDOMEN SURGERY         CURRENT MEDICATIONS      ALLERGIES  Allergies   Allergen Reactions    Ciprofloxacin Itching    Penicillins Itching       WELLS Criteria are negative for pulmonary embolus.  Therefore, I do not feel the risk of radiation outweighs the benefits at this time.  ?  PHYSICAL EXAM  VITAL SIGNS: /86   Pulse 80   Temp 97 °F (36.1 °C) (Oral)   Resp 16   Ht 5' 10\" (1.778 m)   Wt 232 lb 5.8 oz (105.4 kg)   LMP  08/27/2022 (Exact Date)   SpO2 100%   BMI 33.34 kg/m²   Constitutional: Well-developed, well-nourished, appears normal, nontoxic, activity: Resting comfortably on the cart, no obvious pain, speaking full sentences, does not appear ill or toxic. HENT: Normocephalic, Atraumatic, Bilateral ears are normal, Oropharynx moist, No oral exudates, Nose normal.  Eyes: PERRLA, EOMI, Conjunctiva normal, No discharge. No scleral icterus. Neck: Normal range of motion, No tenderness, Supple,  Lymphatic: No lymphadenopathy noted. Cardiovascular: normal heart rate, normal rhythm, no murmurs, no clicks, no rubs, no gallops  Thorax & Lungs: normal breath sounds, no respiratory distress, no wheezing, no rales, no rhonchi, chest pain is reproducible along the left and right sternal borders. Abdomen: Soft, no tender with no guarding, no rebound, no rigidity; no distension, no masses, no pulsatile masses, no hepatosplenomegaly, normal bowel sounds, patient has gravid uterus consistent with dates. Skin: Warm, Dry, No erythema, No rash. Extremities: No edema, No tenderness, No cyanosis, No clubbing. No amputationseconds. Musculoskeletal: Good range of motion in all major joints, No tenderness to palpation or major deformities noted. Neurologic: Alert & oriented x 3  Psychiatric: Affect normal, Mood normal.    COURSE & MEDICAL DECISION MAKING  Pertinent Labs, EKG, & Imaging studies reviewed.  (See chart for details)    LABORATORY  Labs Reviewed   CBC WITH AUTO DIFFERENTIAL - Abnormal; Notable for the following components:       Result Value    WBC 17.5 (*)     Hemoglobin 11.5 (*)     Hematocrit 34.4 (*)     Neutrophils Absolute 12.4 (*)     All other components within normal limits   COMPREHENSIVE METABOLIC PANEL W/ REFLEX TO MG FOR LOW K - Abnormal; Notable for the following components:    Sodium 135 (*)     CO2 20 (*)     Creatinine <0.5 (*)     Total Protein 6.3 (*)     Albumin 3.3 (*)     ALT 7 (*)     AST 10 (*)     All other components within normal limits   STREP SCREEN GROUP A THROAT   COVID-19, RAPID   RAPID INFLUENZA A/B ANTIGENS   CULTURE, BETA STREP CONFIRM PLATES    Narrative:     ORDER#: [id number]                          ORDERED BY: Desi Weston  SOURCE: Throat                             COLLECTED:  02/19/23 01:36  ANTIBIOTICS AT PHANI.:                      RECEIVED :  02/19/23 02:02   TROPONIN     EKG 12 Lead  Order: [id number]  Status: Final result    Visible to patient: No (not released)    Next appt: None    0 Result Notes  Component Ref Range & Units 10/11/21 1851    Ventricular Rate BPM 85    Atrial Rate BPM 85    P-R Interval ms 142    QRS Duration ms 86    Q-T Interval ms 346    QTc Calculation (Bazett) ms 411    P Axis degrees 22    R Axis degrees 7    T Axis degrees -14    Diagnosis  Normal sinus rhythmModerate voltage criteria for LVH, may be normal variantT wave abnormality, consider inferior ischemiaAbnormal ECGNo previous ECGs availableConfirmed by PAPO WELLS, Joy Velasquez ([id number]) on 10/12/2021 7:24:24 AM   Resulting Agency  [facility]      Patient has voltage criteria for LVH. I feel that T wave inversion is secondary to early repolarization abnormalities. RADIOLOGY/PROCEDURES  I personally reviewed the images for this case. No orders to display        Vitals:    02/19/23 0115 02/19/23 0126 02/19/23 0503   BP: 105/62  124/86   Pulse: 82  80   Resp: 18  16   Temp: 97 °F (36.1 °C)  97 °F (36.1 °C)   TempSrc: Oral  Oral   SpO2: 100%     Weight:  232 lb 5.8 oz (105.4 kg)    Height:  5' 10\" (1.778 m)        Medications   0.9 % sodium chloride bolus (0 mLs IntraVENous Stopped 2/19/23 0234)       There are no discharge medications for this patient. SEP-1 CORE MEASURE DATA  Exclusion criteria: the patient is NOT to be included for sepsis due to: Infection is not suspected    Patient remained stable in the ED. her work-up was negative. Troponin was negative. I do not feel patient needs imaging at this time. Wells criteria are negative. She is not tachycardic or hypoxic. Respiratory rate is 16. Pulse ox is 100%. EKG shows LVH by voltage with early repolarization abnormalities. Therefore, I feel patient can be discharged to follow-up with her doctor in 3 to 5 days and return if any problems. She was instructed to take Tylenol for pain. She was instructed to return if she got worse. Diagnostic considerations include but are not limited to:  myocardial infarction, pulmonary embolus, pneumothorax, pneumonia, aortic dissection, empyema, musculoskeletal chest pain, pulmonary contusion, pericardial effusion, pericarditis, GERD, pancreatitis, stomach ulcer, and referred abdominal pain. EKG-ordered to rule out myocardial infarction, rhythm disturbances, conduction disturbances, LVH, RAVINDER, pericarditis, voltage abnormalities, or other pathology that might be causing the patient's symptoms. Chest x-ray-chest x-ray was ordered to rule out pneumonia, pneumothorax, congestive heart failure, cardiomegaly, chest masses, aortic aneurysm, hiatal hernia, rib fractures, or any other pathology that might be causing the patient's symptoms    CBC-CBC was ordered to rule out anemia, infection, abnormal platelet count, polycythemia, abnormal Red cell pathology, or any other pathology that might be causing the patient's symptoms    CMP-CMP was ordered to rule out electrolyte abnormalities, liver dysfunction, kidney dysfunction, electrolyte imbalance, abnormal transaminases, or any other pathology that might be causing the patient's symptoms. Urine-urinalysis was ordered to rule out infection, renal failure, dehydration, pregnancy, proteinuria, bilirubinuria, or any other pathology that might be causing the patient's symptoms    The patient's blood pressure was found to be elevated according to CMS/Medicare and the Affordable Care Act/ObamaCare criteria.  Elevated blood pressure could occur because of pain or anxiety or other reasons and does not mean that they need to have their blood pressure treated or medications otherwise adjusted. However, this could also be a sign that they will need to have their blood pressure treated or medications changed. The patient was instructed to follow up closely with their personal physician to have their blood pressure rechecked. The patient was instructed to take a list of recent blood pressure readings to their next visit with their personal physician. See discharge instructions for specific medications, discharge information, and treatments. They were verbally instructed to return to emergency if any problems    (This chart has been completed using [address]. Although attempts have been made to ensure accuracy, words and/or phrases may not be transcribed as intended.)    Patient refused pain medicines at the time of their exam.    IMPRESSION(S):  1. Chest wall pain        ?   Recheck Times: 0300, 2390 XYZE [facility], DO  02/21/23 7181

## 2023-02-21 LAB — S PYO THROAT QL CULT: NORMAL

## 2023-04-20 ENCOUNTER — HOSPITAL ENCOUNTER (OUTPATIENT)
Age: 38
Discharge: HOME | End: 2023-04-20
Attending: OBSTETRICS & GYNECOLOGY | Admitting: OBSTETRICS & GYNECOLOGY
Payer: COMMERCIAL

## 2023-04-20 VITALS
RESPIRATION RATE: 18 BRPM | TEMPERATURE: 97.7 F | DIASTOLIC BLOOD PRESSURE: 66 MMHG | SYSTOLIC BLOOD PRESSURE: 117 MMHG | OXYGEN SATURATION: 99 % | HEART RATE: 72 BPM

## 2023-04-20 PROBLEM — O36.8130 DECREASED FETAL MOVEMENT AFFECTING MANAGEMENT OF PREGNANCY IN THIRD TRIMESTER, SINGLE OR UNSPECIFIED FETUS: Status: ACTIVE | Noted: 2023-04-20

## 2023-04-20 PROCEDURE — 59025 FETAL NON-STRESS TEST: CPT

## 2023-04-20 PROCEDURE — 99212 OFFICE O/P EST SF 10 MIN: CPT

## 2023-04-21 NOTE — PROCEDURES
Department of Obstetrics and Gynecology  Fetal surveillance testing summary    INDICATIONS:  decreased fetal movement    OBJECTIVE RESULTS:  Time of the test:  Fetal heart variability: moderate  Fetal Heart Rate decelerations: none  Fetal Heart Rate accelerations: yes  Baseline FHR: 130 per minute  Fetal Non-stress Test: reactive  Uterine irritability: no  Uterine contractions: none    Fetal surveillance: reassuring     Electronically signed by Jalen Shah MD on 4/20/2023 at 10:30 PM

## 2023-04-21 NOTE — DISCHARGE INSTRUCTIONS
Home Undelivered Discharge Instructions    After completion of the medical screening evaluation, it has been determined that a medical emergency does not exist and the patient is not in active labor. Therefore, the patient may be discharged home. After Discharge Orders:            Diet:  normal diet as tolerated    Rest: normal activity as tolerated\        Diet/Activity/Restrictions:  Regular  Limit caffeine consumption  Increase your fluid intake  Eat small meals-but often. Rise from laying/sitting position to standing slowly. Avoid laying on your back, sidelying positions are best, left side is preferred. Weigh yourself daily and write down what you weigh. If you had a vaginal exam you may have some bloody mucous or brown vaginal discharge. If your doctor/midwife has ordered antibiotics, get it filled right away and take all of the medication as it has been prescribed. When to call your doctor: If you have severe back pain. Period-like cramps that may come and go. May feel like baby is balling up. Low, dull backache, not relieved by rest.  Pressure in your vagina or lower abdomen that may feel like the baby is pushing down. Change in the type or amount of vaginal discharge. Abdominal cramps that may be accompanied by diarrhea. 4-5 uterine contractions in one hour. Fluid leaking from your vagina (may or may not be bloody)  If you have vaginal bleeding. If you have a temperature of 100.6 or more. If your baby has a decrease in activity. Less than 5 times in an hour. If you feel you are not getting better or you are concerned. If you develop a headache, not resolved with your usual interventions. If you have changes in your vision (blurring or spots in your vision). If you have burning with urination, urgency or frequency. If you have pain in your abdomen, up by your ribs.                 General Instructions:    Nausea and Vomiting  Keep dry toast/crackers with you to munch on  Eat small

## 2023-04-21 NOTE — FLOWSHEET NOTE
Pt presents to Triage D for decrease fetal movement. Pt denies any pain. Pt placed on central banking EFM and toco monitor. Upon placement of monitor fetal movement observed. Pt denies any bleeding or leaking of fluid. Abdomen soft, pt denies any contractions. History obtained. Pt reports drinking water and eating dinner and laying down trying to feel fetal movement. At end of assessment pt verbalizes feeling fetal movement in abdomen and vagina.

## 2023-04-21 NOTE — FLOWSHEET NOTE
04/20/23 2240   Fetal Heart Rate   Mode External US   Baseline Rate 130 bpm   Baseline Classification Normal   Variability 6-25 BPM   Pattern Accelerations   Patient Feels Fetal Movement Yes   Interventions RN at Bedside;Ultrasound Adjusted; [place] Adjusted   OB Bladder Status Non-distended   Fetal Monitoring Strip   FMS Reviewed?  Yes   FMS Reviewed By? cs/ dr. Steven Davies   Uterine Activity   UA Mode [place];Palpation   Contraction Frequency none   Resting Tone Palpated Soft

## 2023-04-21 NOTE — FLOWSHEET NOTE
After completion of the Medical Screening Evaluation, it has been determined that a medical emergency does not exist and the patient is not in active labor, and therefore the patient may be discharged home. Patient given triage discharge instructions. Patient instructed to increase oral fluids to 8-10 glasses of water or fruit juice everyday, to resume regular diet and activity. All pregnancy warning signs and symptoms reviewed. Fetal kick counts reviewed. Patient aware of when to follow up with provider and all questions answered. Patient ambulatory home with instructions.

## 2023-05-01 LAB — GBS, EXTERNAL RESULT: NEGATIVE

## 2023-05-13 ENCOUNTER — HOSPITAL ENCOUNTER (OUTPATIENT)
Age: 38
Discharge: HOME | End: 2023-05-13
Attending: OBSTETRICS & GYNECOLOGY | Admitting: OBSTETRICS & GYNECOLOGY
Payer: COMMERCIAL

## 2023-05-13 VITALS
TEMPERATURE: 98.5 F | DIASTOLIC BLOOD PRESSURE: 84 MMHG | HEART RATE: 71 BPM | RESPIRATION RATE: 18 BRPM | SYSTOLIC BLOOD PRESSURE: 134 MMHG

## 2023-05-13 LAB
ABO + RH BLD: NORMAL
ALBUMIN SERPL-MCNC: 3.4 G/DL (ref 3.4–5)
ALBUMIN/GLOB SERPL: 1.5 {RATIO} (ref 1.1–2.2)
ALP SERPL-CCNC: 130 U/L (ref 40–129)
ALT SERPL-CCNC: 7 U/L (ref 10–40)
ANION GAP SERPL CALCULATED.3IONS-SCNC: 11 MMOL/L (ref 3–16)
APTT BLD: 24.5 SEC (ref 22.7–35.9)
AST SERPL-CCNC: 12 U/L (ref 15–37)
BILIRUB SERPL-MCNC: <0.2 MG/DL (ref 0–1)
BLD GP AB SCN SERPL QL: NORMAL
BUN SERPL-MCNC: 10 MG/DL (ref 7–20)
CALCIUM SERPL-MCNC: 9 MG/DL (ref 8.3–10.6)
CHLORIDE SERPL-SCNC: 106 MMOL/L (ref 99–110)
CO2 SERPL-SCNC: 19 MMOL/L (ref 21–32)
CREAT SERPL-MCNC: 0.6 MG/DL (ref 0.6–1.1)
CREAT UR-MCNC: 278.4 MG/DL (ref 28–259)
DEPRECATED RDW RBC AUTO: 13.8 % (ref 12.4–15.4)
GFR SERPLBLD CREATININE-BSD FMLA CKD-EPI: >60 ML/MIN/{1.73_M2}
GLUCOSE SERPL-MCNC: 138 MG/DL (ref 70–99)
HCT VFR BLD AUTO: 30.8 % (ref 36–48)
HGB BLD-MCNC: 10.1 G/DL (ref 12–16)
INR PPP: 1.02 (ref 0.84–1.16)
MCH RBC QN AUTO: 26.2 PG (ref 26–34)
MCHC RBC AUTO-ENTMCNC: 32.6 G/DL (ref 31–36)
MCV RBC AUTO: 80.2 FL (ref 80–100)
PLATELET # BLD AUTO: 249 K/UL (ref 135–450)
PMV BLD AUTO: 9.6 FL (ref 5–10.5)
POTASSIUM SERPL-SCNC: 4.3 MMOL/L (ref 3.5–5.1)
PROT SERPL-MCNC: 5.7 G/DL (ref 6.4–8.2)
PROT UR-MCNC: 53 MG/DL
PROT/CREAT UR-RTO: 0.2 MG/DL
PROTHROMBIN TIME: 13.4 SEC (ref 11.5–14.8)
RBC # BLD AUTO: 3.84 M/UL (ref 4–5.2)
SODIUM SERPL-SCNC: 136 MMOL/L (ref 136–145)
URATE SERPL-MCNC: 5 MG/DL (ref 2.6–6)
WBC # BLD AUTO: 9.5 K/UL (ref 4–11)

## 2023-05-13 PROCEDURE — 84550 ASSAY OF BLOOD/URIC ACID: CPT

## 2023-05-13 PROCEDURE — 99215 OFFICE O/P EST HI 40 MIN: CPT

## 2023-05-13 PROCEDURE — 59025 FETAL NON-STRESS TEST: CPT

## 2023-05-13 PROCEDURE — 84156 ASSAY OF PROTEIN URINE: CPT

## 2023-05-13 PROCEDURE — 86901 BLOOD TYPING SEROLOGIC RH(D): CPT

## 2023-05-13 PROCEDURE — 86850 RBC ANTIBODY SCREEN: CPT

## 2023-05-13 PROCEDURE — 85610 PROTHROMBIN TIME: CPT

## 2023-05-13 PROCEDURE — 80053 COMPREHEN METABOLIC PANEL: CPT

## 2023-05-13 PROCEDURE — 85730 THROMBOPLASTIN TIME PARTIAL: CPT

## 2023-05-13 PROCEDURE — 86900 BLOOD TYPING SEROLOGIC ABO: CPT

## 2023-05-13 PROCEDURE — 85027 COMPLETE CBC AUTOMATED: CPT

## 2023-05-13 PROCEDURE — 82570 ASSAY OF URINE CREATININE: CPT

## 2023-05-13 RX ORDER — ACETAMINOPHEN 325 MG/1
650 TABLET ORAL EVERY 6 HOURS PRN
COMMUNITY

## 2023-05-13 NOTE — FLOWSHEET NOTE
After completion of the Medical Screening Evaluation, it has been determined that a medical emergency does not exist and the patient is not in active labor, and therefore the patient may be discharged home.

## 2023-05-13 NOTE — PROGRESS NOTES
House OB    Asked to see this 42 yo [id number] with IUP at 37 weeks who presents to L&D for HAs and some elevated BPs at home. Nurses discussed with Dr. Madhu Johnson and patient ready to go home. Here patient has a HA but BPs all good and PEP labs normal including Pr/Cr ration of 0.2  Reactive NST    PE: WF in NAD   A&oX3. Abdomen:  soft, nontender. Uterus nontender   Ext: no c/c/ e. Neuro. 2+/0, A&Ox3. IUP at 40 wks    Dr. Madhu Johnson instructed po caffeine and Tylenol   She has OB appt in < 48 hours   Labor and preeclampsia precautions reviewed   PEP labs nl and BPs here normal   D/C home.

## 2023-05-13 NOTE — FLOWSHEET NOTE
Dr. Poncho Doherty viewed labs, NST. Order to discharge to home and follow up as scheduled Monday. Dr. Damaris Jara to assess pt. Order to take Tylenol and caffeine.

## 2023-05-13 NOTE — DISCHARGE INSTRUCTIONS
Home Undelivered Discharge Instructions    After Discharge Orders: Follow up as scheduled Monday 5/15/23. Diet:  normal diet as tolerated  Rest: normal activity as tolerated, try to take it easy this weekend per MD.    Other instructions: Do kick counts once a day on your baby. Choose the time of day your baby is most active. Get in a comfortable lying or sitting position and time how long it takes to feel 10 kicks, twists, turns, swishes, or rolls. Call your physician or midwife if there have not been 10 kicks in 1 hours    Call physician or midwife, return to Labor and Delivery, call 911, or go to the nearest Emergency Room if: increased leakage or fluid, contractions more than  5 per  1 hour, decreased fetal movement, persistent low back pain or cramping, bleeding from vaginal area, difficulty urinating, pain with urination, difficulty breathing, new calf pain, persistent headache, vision change, or any other concerns.

## 2023-05-13 NOTE — PROGRESS NOTES
@ 37wks seen for HA & reported elevated BP @ home  Nl BP's & labs   reactive  DC. RTC as scheduled Monday. Precautions.

## 2023-05-13 NOTE — FLOWSHEET NOTE
Pt arrived to triage. Pt complain of HA, blurred visions, fingers swollen, eleavated BPs at home. Pt reports Dr. Leon Gusman notified and instructed to come to [facility]. Pt reports fetal movement. Pt placed on EFM. Pt oriented to room and call light. Pt denies any questions at this time. Call light in reach.

## 2023-05-15 ENCOUNTER — HOSPITAL ENCOUNTER (INPATIENT)
Age: 38
LOS: 2 days | Discharge: HOME | End: 2023-05-17
Attending: OBSTETRICS & GYNECOLOGY | Admitting: OBSTETRICS & GYNECOLOGY
Payer: COMMERCIAL

## 2023-05-15 ENCOUNTER — ANESTHESIA EVENT (OUTPATIENT)
Dept: LABOR AND DELIVERY | Age: 38
End: 2023-05-15
Payer: COMMERCIAL

## 2023-05-15 ENCOUNTER — ANESTHESIA (OUTPATIENT)
Dept: LABOR AND DELIVERY | Age: 38
End: 2023-05-15
Payer: COMMERCIAL

## 2023-05-15 DIAGNOSIS — Z98.891 STATUS POST REPEAT LOW TRANSVERSE CESAREAN SECTION: Primary | ICD-10-CM

## 2023-05-15 PROBLEM — O09.40 GESTATIONAL HYPERTENSION AFFECTING EIGHTH PREGNANCY: Status: ACTIVE | Noted: 2023-05-15

## 2023-05-15 PROBLEM — O13.9 GESTATIONAL HYPERTENSION AFFECTING EIGHTH PREGNANCY: Status: ACTIVE | Noted: 2023-05-15

## 2023-05-15 LAB
ABO + RH BLD: NORMAL
ABO + RH BLD: NORMAL
ALBUMIN SERPL-MCNC: 3.2 G/DL (ref 3.4–5)
ALBUMIN/GLOB SERPL: 1.5 {RATIO} (ref 1.1–2.2)
ALP SERPL-CCNC: 127 U/L (ref 40–129)
ALT SERPL-CCNC: 7 U/L (ref 10–40)
AMPHETAMINES UR QL SCN>1000 NG/ML: NORMAL
ANION GAP SERPL CALCULATED.3IONS-SCNC: 13 MMOL/L (ref 3–16)
APTT BLD: 25.1 SEC (ref 22.7–35.9)
AST SERPL-CCNC: 12 U/L (ref 15–37)
BACTERIA URNS QL MICRO: ABNORMAL /HPF
BARBITURATES UR QL SCN>200 NG/ML: NORMAL
BASOPHILS # BLD: 0 K/UL (ref 0–0.2)
BASOPHILS NFR BLD: 0.3 %
BENZODIAZ UR QL SCN>200 NG/ML: NORMAL
BILIRUB SERPL-MCNC: <0.2 MG/DL (ref 0–1)
BILIRUB UR QL STRIP.AUTO: ABNORMAL
BLD GP AB SCN SERPL QL: NORMAL
BUN SERPL-MCNC: 12 MG/DL (ref 7–20)
BUPRENORPHINE+NOR UR QL SCN: NORMAL
CALCIUM SERPL-MCNC: 8.8 MG/DL (ref 8.3–10.6)
CANNABINOIDS UR QL SCN>50 NG/ML: NORMAL
CHLORIDE SERPL-SCNC: 107 MMOL/L (ref 99–110)
CLARITY UR: ABNORMAL
CO2 SERPL-SCNC: 19 MMOL/L (ref 21–32)
COCAINE UR QL SCN: NORMAL
COLOR UR: YELLOW
CREAT SERPL-MCNC: 0.6 MG/DL (ref 0.6–1.1)
CREAT UR-MCNC: 316 MG/DL (ref 28–259)
DEPRECATED RDW RBC AUTO: 13.7 % (ref 12.4–15.4)
DRUG SCREEN COMMENT UR-IMP: NORMAL
EOSINOPHIL # BLD: 0.1 K/UL (ref 0–0.6)
EOSINOPHIL NFR BLD: 0.7 %
EPI CELLS #/AREA URNS HPF: ABNORMAL /HPF (ref 0–5)
FENTANYL SCREEN, URINE: NORMAL
FETAL SCREEN: NORMAL
FIBRINOGEN PPP-MCNC: 487 MG/DL (ref 243–550)
GFR SERPLBLD CREATININE-BSD FMLA CKD-EPI: >60 ML/MIN/{1.73_M2}
GLUCOSE SERPL-MCNC: 91 MG/DL (ref 70–99)
GLUCOSE UR STRIP.AUTO-MCNC: NEGATIVE MG/DL
HCT VFR BLD AUTO: 28.8 % (ref 36–48)
HGB BLD-MCNC: 9.5 G/DL (ref 12–16)
HGB UR QL STRIP.AUTO: NEGATIVE
INR PPP: 1.03 (ref 0.84–1.16)
KETONES UR STRIP.AUTO-MCNC: ABNORMAL MG/DL
LEUKOCYTE ESTERASE UR QL STRIP.AUTO: ABNORMAL
LYMPHOCYTES # BLD: 2 K/UL (ref 1–5.1)
LYMPHOCYTES NFR BLD: 20.5 %
MCH RBC QN AUTO: 26.8 PG (ref 26–34)
MCHC RBC AUTO-ENTMCNC: 33.1 G/DL (ref 31–36)
MCV RBC AUTO: 81.1 FL (ref 80–100)
METHADONE UR QL SCN>300 NG/ML: NORMAL
MONOCYTES # BLD: 0.4 K/UL (ref 0–1.3)
MONOCYTES NFR BLD: 4.4 %
MUCOUS THREADS #/AREA URNS LPF: ABNORMAL /LPF
NEUTROPHILS # BLD: 7.2 K/UL (ref 1.7–7.7)
NEUTROPHILS NFR BLD: 74.1 %
NITRITE UR QL STRIP.AUTO: NEGATIVE
OPIATES UR QL SCN>300 NG/ML: NORMAL
OXYCODONE UR QL SCN: NORMAL
PCP UR QL SCN>25 NG/ML: NORMAL
PH UR STRIP.AUTO: 5.5 [PH] (ref 5–8)
PH UR STRIP: 6.5 [PH]
PLATELET # BLD AUTO: 242 K/UL (ref 135–450)
PMV BLD AUTO: 9.6 FL (ref 5–10.5)
POTASSIUM SERPL-SCNC: 4.1 MMOL/L (ref 3.5–5.1)
PROT SERPL-MCNC: 5.3 G/DL (ref 6.4–8.2)
PROT UR STRIP.AUTO-MCNC: ABNORMAL MG/DL
PROT UR-MCNC: 47 MG/DL
PROT/CREAT UR-RTO: 0.1 MG/DL
PROTHROMBIN TIME: 13.6 SEC (ref 11.5–14.8)
RBC # BLD AUTO: 3.56 M/UL (ref 4–5.2)
RBC #/AREA URNS HPF: ABNORMAL /HPF (ref 0–4)
SODIUM SERPL-SCNC: 139 MMOL/L (ref 136–145)
SP GR UR STRIP.AUTO: >=1.03 (ref 1–1.03)
UA DIPSTICK W REFLEX MICRO PNL UR: YES
URATE SERPL-MCNC: 5.5 MG/DL (ref 2.6–6)
URN SPEC COLLECT METH UR: ABNORMAL
UROBILINOGEN UR STRIP-ACNC: 0.2 E.U./DL
WBC # BLD AUTO: 9.7 K/UL (ref 4–11)
WBC #/AREA URNS HPF: ABNORMAL /HPF (ref 0–5)

## 2023-05-15 PROCEDURE — A4216 STERILE WATER/SALINE, 10 ML: HCPCS | Performed by: OBSTETRICS & GYNECOLOGY

## 2023-05-15 PROCEDURE — 85730 THROMBOPLASTIN TIME PARTIAL: CPT

## 2023-05-15 PROCEDURE — 96374 THER/PROPH/DIAG INJ IV PUSH: CPT

## 2023-05-15 PROCEDURE — 3700000000 HC ANESTHESIA ATTENDED CARE: Performed by: OBSTETRICS & GYNECOLOGY

## 2023-05-15 PROCEDURE — 84550 ASSAY OF BLOOD/URIC ACID: CPT

## 2023-05-15 PROCEDURE — 3700000001 HC ADD 15 MINUTES (ANESTHESIA): Performed by: OBSTETRICS & GYNECOLOGY

## 2023-05-15 PROCEDURE — 6370000000 HC RX 637 (ALT 250 FOR IP)

## 2023-05-15 PROCEDURE — 3609079900 HC CESAREAN SECTION: Performed by: OBSTETRICS & GYNECOLOGY

## 2023-05-15 PROCEDURE — 86850 RBC ANTIBODY SCREEN: CPT

## 2023-05-15 PROCEDURE — 2580000003 HC RX 258: Performed by: OBSTETRICS & GYNECOLOGY

## 2023-05-15 PROCEDURE — 6360000002 HC RX W HCPCS: Performed by: ANESTHESIOLOGY

## 2023-05-15 PROCEDURE — 85384 FIBRINOGEN ACTIVITY: CPT

## 2023-05-15 PROCEDURE — 2500000003 HC RX 250 WO HCPCS: Performed by: NURSE ANESTHETIST, CERTIFIED REGISTERED

## 2023-05-15 PROCEDURE — 84156 ASSAY OF PROTEIN URINE: CPT

## 2023-05-15 PROCEDURE — 1220000000 HC SEMI PRIVATE OB R&B

## 2023-05-15 PROCEDURE — 6370000000 HC RX 637 (ALT 250 FOR IP): Performed by: OBSTETRICS & GYNECOLOGY

## 2023-05-15 PROCEDURE — 7100000000 HC PACU RECOVERY - FIRST 15 MIN: Performed by: OBSTETRICS & GYNECOLOGY

## 2023-05-15 PROCEDURE — 86900 BLOOD TYPING SEROLOGIC ABO: CPT

## 2023-05-15 PROCEDURE — 6370000000 HC RX 637 (ALT 250 FOR IP): Performed by: ANESTHESIOLOGY

## 2023-05-15 PROCEDURE — 2500000003 HC RX 250 WO HCPCS: Performed by: ANESTHESIOLOGY

## 2023-05-15 PROCEDURE — 85610 PROTHROMBIN TIME: CPT

## 2023-05-15 PROCEDURE — 85461 HEMOGLOBIN FETAL: CPT

## 2023-05-15 PROCEDURE — 80307 DRUG TEST PRSMV CHEM ANLYZR: CPT

## 2023-05-15 PROCEDURE — 6360000002 HC RX W HCPCS: Performed by: NURSE ANESTHETIST, CERTIFIED REGISTERED

## 2023-05-15 PROCEDURE — 2500000003 HC RX 250 WO HCPCS: Performed by: OBSTETRICS & GYNECOLOGY

## 2023-05-15 PROCEDURE — 80053 COMPREHEN METABOLIC PANEL: CPT

## 2023-05-15 PROCEDURE — 85025 COMPLETE CBC W/AUTO DIFF WBC: CPT

## 2023-05-15 PROCEDURE — 86901 BLOOD TYPING SEROLOGIC RH(D): CPT

## 2023-05-15 PROCEDURE — 86780 TREPONEMA PALLIDUM: CPT

## 2023-05-15 PROCEDURE — 2709999900 HC NON-CHARGEABLE SUPPLY: Performed by: OBSTETRICS & GYNECOLOGY

## 2023-05-15 PROCEDURE — 2580000003 HC RX 258: Performed by: NURSE ANESTHETIST, CERTIFIED REGISTERED

## 2023-05-15 PROCEDURE — 81001 URINALYSIS AUTO W/SCOPE: CPT

## 2023-05-15 PROCEDURE — 36415 COLL VENOUS BLD VENIPUNCTURE: CPT

## 2023-05-15 PROCEDURE — 82570 ASSAY OF URINE CREATININE: CPT

## 2023-05-15 PROCEDURE — 7100000001 HC PACU RECOVERY - ADDTL 15 MIN: Performed by: OBSTETRICS & GYNECOLOGY

## 2023-05-15 PROCEDURE — 6360000002 HC RX W HCPCS: Performed by: OBSTETRICS & GYNECOLOGY

## 2023-05-15 PROCEDURE — 96375 TX/PRO/DX INJ NEW DRUG ADDON: CPT

## 2023-05-15 RX ORDER — OXYCODONE HYDROCHLORIDE 5 MG/1
5 TABLET ORAL EVERY 4 HOURS PRN
Status: DISCONTINUED | OUTPATIENT
Start: 2023-05-15 | End: 2023-05-17 | Stop reason: HOSPADM

## 2023-05-15 RX ORDER — SODIUM CHLORIDE, SODIUM LACTATE, POTASSIUM CHLORIDE, AND CALCIUM CHLORIDE .6; .31; .03; .02 G/100ML; G/100ML; G/100ML; G/100ML
1000 INJECTION, SOLUTION INTRAVENOUS ONCE
Status: COMPLETED | OUTPATIENT
Start: 2023-05-15 | End: 2023-05-15

## 2023-05-15 RX ORDER — METOCLOPRAMIDE HYDROCHLORIDE 5 MG/ML
10 INJECTION INTRAMUSCULAR; INTRAVENOUS ONCE
Status: CANCELLED | OUTPATIENT
Start: 2023-05-15

## 2023-05-15 RX ORDER — SIMETHICONE 80 MG
80 TABLET,CHEWABLE ORAL EVERY 6 HOURS PRN
Status: DISCONTINUED | OUTPATIENT
Start: 2023-05-15 | End: 2023-05-17 | Stop reason: HOSPADM

## 2023-05-15 RX ORDER — SODIUM CHLORIDE, SODIUM LACTATE, POTASSIUM CHLORIDE, CALCIUM CHLORIDE 600; 310; 30; 20 MG/100ML; MG/100ML; MG/100ML; MG/100ML
INJECTION, SOLUTION INTRAVENOUS CONTINUOUS
Status: DISCONTINUED | OUTPATIENT
Start: 2023-05-15 | End: 2023-05-17 | Stop reason: HOSPADM

## 2023-05-15 RX ORDER — BUPIVACAINE HYDROCHLORIDE 7.5 MG/ML
INJECTION, SOLUTION INTRASPINAL
Status: COMPLETED | OUTPATIENT
Start: 2023-05-15 | End: 2023-05-15

## 2023-05-15 RX ORDER — TRISODIUM CITRATE DIHYDRATE AND CITRIC ACID MONOHYDRATE 500; 334 MG/5ML; MG/5ML
30 SOLUTION ORAL ONCE
Status: DISCONTINUED | OUTPATIENT
Start: 2023-05-15 | End: 2023-05-15 | Stop reason: SDUPTHER

## 2023-05-15 RX ORDER — ONDANSETRON 2 MG/ML
4 INJECTION INTRAMUSCULAR; INTRAVENOUS EVERY 6 HOURS PRN
Status: DISPENSED | OUTPATIENT
Start: 2023-05-15 | End: 2023-05-16

## 2023-05-15 RX ORDER — METOCLOPRAMIDE HYDROCHLORIDE 5 MG/ML
10 INJECTION INTRAMUSCULAR; INTRAVENOUS ONCE
Status: COMPLETED | OUTPATIENT
Start: 2023-05-15 | End: 2023-05-15

## 2023-05-15 RX ORDER — SODIUM CHLORIDE 0.9 % (FLUSH) 0.9 %
5-40 SYRINGE (ML) INJECTION EVERY 12 HOURS SCHEDULED
Status: DISCONTINUED | OUTPATIENT
Start: 2023-05-15 | End: 2023-05-17 | Stop reason: HOSPADM

## 2023-05-15 RX ORDER — KETOROLAC TROMETHAMINE 30 MG/ML
INJECTION, SOLUTION INTRAMUSCULAR; INTRAVENOUS PRN
Status: DISCONTINUED | OUTPATIENT
Start: 2023-05-15 | End: 2023-05-21 | Stop reason: SDUPTHER

## 2023-05-15 RX ORDER — ACETAMINOPHEN 500 MG
TABLET ORAL
Status: COMPLETED
Start: 2023-05-15 | End: 2023-05-15

## 2023-05-15 RX ORDER — SODIUM CHLORIDE, SODIUM LACTATE, POTASSIUM CHLORIDE, CALCIUM CHLORIDE 600; 310; 30; 20 MG/100ML; MG/100ML; MG/100ML; MG/100ML
INJECTION, SOLUTION INTRAVENOUS CONTINUOUS PRN
Status: DISCONTINUED | OUTPATIENT
Start: 2023-05-15 | End: 2023-05-21 | Stop reason: SDUPTHER

## 2023-05-15 RX ORDER — ONDANSETRON 2 MG/ML
INJECTION INTRAMUSCULAR; INTRAVENOUS PRN
Status: DISCONTINUED | OUTPATIENT
Start: 2023-05-15 | End: 2023-05-21 | Stop reason: SDUPTHER

## 2023-05-15 RX ORDER — SODIUM CHLORIDE 0.9 % (FLUSH) 0.9 %
5-40 SYRINGE (ML) INJECTION PRN
Status: DISCONTINUED | OUTPATIENT
Start: 2023-05-15 | End: 2023-05-17 | Stop reason: HOSPADM

## 2023-05-15 RX ORDER — ONDANSETRON 2 MG/ML
4 INJECTION INTRAMUSCULAR; INTRAVENOUS EVERY 6 HOURS PRN
Status: DISCONTINUED | OUTPATIENT
Start: 2023-05-15 | End: 2023-05-17 | Stop reason: HOSPADM

## 2023-05-15 RX ORDER — DOCUSATE SODIUM 100 MG/1
100 CAPSULE, LIQUID FILLED ORAL 2 TIMES DAILY
Status: DISCONTINUED | OUTPATIENT
Start: 2023-05-15 | End: 2023-05-17 | Stop reason: HOSPADM

## 2023-05-15 RX ORDER — CALCIUM CARBONATE 200(500)MG
1 TABLET,CHEWABLE ORAL DAILY
COMMUNITY

## 2023-05-15 RX ORDER — TRISODIUM CITRATE DIHYDRATE AND CITRIC ACID MONOHYDRATE 500; 334 MG/5ML; MG/5ML
30 SOLUTION ORAL ONCE
Status: COMPLETED | OUTPATIENT
Start: 2023-05-15 | End: 2023-05-15

## 2023-05-15 RX ORDER — ACETAMINOPHEN 500 MG
1000 TABLET ORAL EVERY 4 HOURS PRN
Status: COMPLETED | OUTPATIENT
Start: 2023-05-15 | End: 2023-05-15

## 2023-05-15 RX ORDER — MORPHINE SULFATE 1 MG/ML
INJECTION, SOLUTION EPIDURAL; INTRATHECAL; INTRAVENOUS
Status: COMPLETED | OUTPATIENT
Start: 2023-05-15 | End: 2023-05-15

## 2023-05-15 RX ORDER — LANOLIN 100 %
OINTMENT (GRAM) TOPICAL
Status: DISCONTINUED | OUTPATIENT
Start: 2023-05-15 | End: 2023-05-17 | Stop reason: HOSPADM

## 2023-05-15 RX ORDER — KETOROLAC TROMETHAMINE 30 MG/ML
30 INJECTION, SOLUTION INTRAMUSCULAR; INTRAVENOUS EVERY 6 HOURS
Status: COMPLETED | OUTPATIENT
Start: 2023-05-15 | End: 2023-05-16

## 2023-05-15 RX ORDER — EPHEDRINE SULFATE/0.9% NACL/PF 50 MG/5 ML
SYRINGE (ML) INTRAVENOUS PRN
Status: DISCONTINUED | OUTPATIENT
Start: 2023-05-15 | End: 2023-05-21 | Stop reason: SDUPTHER

## 2023-05-15 RX ORDER — SODIUM CHLORIDE 0.9 % (FLUSH) 0.9 %
10 SYRINGE (ML) INJECTION EVERY 12 HOURS SCHEDULED
Status: DISCONTINUED | OUTPATIENT
Start: 2023-05-15 | End: 2023-05-17 | Stop reason: HOSPADM

## 2023-05-15 RX ORDER — ACETAMINOPHEN 500 MG
1000 TABLET ORAL EVERY 8 HOURS PRN
Status: DISCONTINUED | OUTPATIENT
Start: 2023-05-15 | End: 2023-05-17 | Stop reason: HOSPADM

## 2023-05-15 RX ORDER — NALOXONE HYDROCHLORIDE 0.4 MG/ML
INJECTION, SOLUTION INTRAMUSCULAR; INTRAVENOUS; SUBCUTANEOUS PRN
Status: ACTIVE | OUTPATIENT
Start: 2023-05-15 | End: 2023-05-16

## 2023-05-15 RX ORDER — OXYTOCIN 10 [USP'U]/ML
INJECTION, SOLUTION INTRAMUSCULAR; INTRAVENOUS PRN
Status: DISCONTINUED | OUTPATIENT
Start: 2023-05-15 | End: 2023-05-21 | Stop reason: SDUPTHER

## 2023-05-15 RX ORDER — SODIUM CHLORIDE 9 MG/ML
INJECTION, SOLUTION INTRAVENOUS PRN
Status: DISCONTINUED | OUTPATIENT
Start: 2023-05-15 | End: 2023-05-17 | Stop reason: HOSPADM

## 2023-05-15 RX ORDER — FENTANYL CITRATE 50 UG/ML
INJECTION, SOLUTION INTRAMUSCULAR; INTRAVENOUS
Status: COMPLETED | OUTPATIENT
Start: 2023-05-15 | End: 2023-05-15

## 2023-05-15 RX ORDER — IBUPROFEN 600 MG/1
600 TABLET ORAL EVERY 8 HOURS PRN
Status: DISCONTINUED | OUTPATIENT
Start: 2023-05-15 | End: 2023-05-17 | Stop reason: HOSPADM

## 2023-05-15 RX ORDER — SODIUM CHLORIDE 0.9 % (FLUSH) 0.9 %
10 SYRINGE (ML) INJECTION PRN
Status: DISCONTINUED | OUTPATIENT
Start: 2023-05-15 | End: 2023-05-17 | Stop reason: HOSPADM

## 2023-05-15 RX ORDER — MIDAZOLAM HYDROCHLORIDE 1 MG/ML
INJECTION INTRAMUSCULAR; INTRAVENOUS PRN
Status: DISCONTINUED | OUTPATIENT
Start: 2023-05-15 | End: 2023-05-21 | Stop reason: SDUPTHER

## 2023-05-15 RX ORDER — PROMETHAZINE HYDROCHLORIDE 25 MG/ML
12.5 INJECTION, SOLUTION INTRAMUSCULAR; INTRAVENOUS ONCE
Status: DISCONTINUED | OUTPATIENT
Start: 2023-05-15 | End: 2023-05-15 | Stop reason: DRUGHIGH

## 2023-05-15 RX ORDER — KETOROLAC TROMETHAMINE 30 MG/ML
30 INJECTION, SOLUTION INTRAMUSCULAR; INTRAVENOUS EVERY 6 HOURS PRN
Status: DISCONTINUED | OUTPATIENT
Start: 2023-05-16 | End: 2023-05-17 | Stop reason: HOSPADM

## 2023-05-15 RX ADMIN — Medication 87.3 MILLI-UNITS/MIN: at 14:53

## 2023-05-15 RX ADMIN — SODIUM CHLORIDE, PRESERVATIVE FREE 10 ML: 5 INJECTION INTRAVENOUS at 20:43

## 2023-05-15 RX ADMIN — KETOROLAC TROMETHAMINE 30 MG: 30 INJECTION, SOLUTION INTRAMUSCULAR at 20:43

## 2023-05-15 RX ADMIN — TRANEXAMIC ACID 1000 MG: 100 INJECTION, SOLUTION INTRAVENOUS at 16:11

## 2023-05-15 RX ADMIN — FENTANYL CITRATE 10 MCG: 50 INJECTION INTRAMUSCULAR; INTRAVENOUS at 13:31

## 2023-05-15 RX ADMIN — ACETAMINOPHEN 1000 MG: 500 TABLET ORAL at 12:57

## 2023-05-15 RX ADMIN — FAMOTIDINE 20 MG: 10 INJECTION, SOLUTION INTRAVENOUS at 12:56

## 2023-05-15 RX ADMIN — SODIUM CHLORIDE, POTASSIUM CHLORIDE, SODIUM LACTATE AND CALCIUM CHLORIDE 1000 ML: 600; 310; 30; 20 INJECTION, SOLUTION INTRAVENOUS at 19:16

## 2023-05-15 RX ADMIN — KETOROLAC TROMETHAMINE 30 MG: 30 INJECTION, SOLUTION INTRAMUSCULAR at 14:13

## 2023-05-15 RX ADMIN — METOCLOPRAMIDE 10 MG: 5 INJECTION, SOLUTION INTRAMUSCULAR; INTRAVENOUS at 12:55

## 2023-05-15 RX ADMIN — Medication 12.5 MG: at 17:50

## 2023-05-15 RX ADMIN — MORPHINE SULFATE 0.2 MG: 1 INJECTION, SOLUTION EPIDURAL; INTRATHECAL; INTRAVENOUS at 13:31

## 2023-05-15 RX ADMIN — MIDAZOLAM 1 MG: 1 INJECTION INTRAMUSCULAR; INTRAVENOUS at 13:14

## 2023-05-15 RX ADMIN — DOCUSATE SODIUM 100 MG: 100 CAPSULE, LIQUID FILLED ORAL at 20:50

## 2023-05-15 RX ADMIN — SODIUM CHLORIDE, POTASSIUM CHLORIDE, SODIUM LACTATE AND CALCIUM CHLORIDE: 600; 310; 30; 20 INJECTION, SOLUTION INTRAVENOUS at 14:52

## 2023-05-15 RX ADMIN — OXYTOCIN 20 UNITS: 10 INJECTION INTRAVENOUS at 13:49

## 2023-05-15 RX ADMIN — SODIUM CHLORIDE, SODIUM LACTATE, POTASSIUM CHLORIDE, AND CALCIUM CHLORIDE: .6; .31; .03; .02 INJECTION, SOLUTION INTRAVENOUS at 13:46

## 2023-05-15 RX ADMIN — SODIUM CHLORIDE, POTASSIUM CHLORIDE, SODIUM LACTATE AND CALCIUM CHLORIDE: 600; 310; 30; 20 INJECTION, SOLUTION INTRAVENOUS at 20:47

## 2023-05-15 RX ADMIN — Medication 1000 MG: at 12:57

## 2023-05-15 RX ADMIN — SODIUM CHLORIDE, POTASSIUM CHLORIDE, SODIUM LACTATE AND CALCIUM CHLORIDE 1000 ML: 600; 310; 30; 20 INJECTION, SOLUTION INTRAVENOUS at 11:42

## 2023-05-15 RX ADMIN — CEFAZOLIN 2000 MG: 2 INJECTION, POWDER, FOR SOLUTION INTRAMUSCULAR; INTRAVENOUS at 13:09

## 2023-05-15 RX ADMIN — ONDANSETRON 4 MG: 2 INJECTION INTRAMUSCULAR; INTRAVENOUS at 16:25

## 2023-05-15 RX ADMIN — BUPIVACAINE HYDROCHLORIDE IN DEXTROSE 13.5 MG: 7.5 INJECTION, SOLUTION SUBARACHNOID at 13:31

## 2023-05-15 RX ADMIN — MIDAZOLAM 1 MG: 1 INJECTION INTRAMUSCULAR; INTRAVENOUS at 13:54

## 2023-05-15 RX ADMIN — SODIUM CITRATE AND CITRIC ACID MONOHYDRATE 30 ML: 500; 334 SOLUTION ORAL at 12:56

## 2023-05-15 RX ADMIN — Medication 10 MG: at 13:32

## 2023-05-15 RX ADMIN — SODIUM CHLORIDE, POTASSIUM CHLORIDE, SODIUM LACTATE AND CALCIUM CHLORIDE: 600; 310; 30; 20 INJECTION, SOLUTION INTRAVENOUS at 12:48

## 2023-05-15 RX ADMIN — ONDANSETRON 4 MG: 2 INJECTION INTRAMUSCULAR; INTRAVENOUS at 13:00

## 2023-05-15 ASSESSMENT — PAIN SCALES - GENERAL
PAINLEVEL_OUTOF10: 0
PAINLEVEL_OUTOF10: 0

## 2023-05-15 NOTE — OP NOTE
procedure. Pfannenstiel skin incision was made with the scalpel and carried down to the underlying fascia. The fascial was incised and incision was extended laterally with Garcia scissors. The superior fascial incision was grasped with Kocher clamps, tented up, and the underlying rectus muscles were dissected bluntly. The inferior edge of the rectus fascia was grasped with Kocher clamps, tented up, and the underlying rectus muscle was dissected off bluntly. The rectus muscle was divided in the midline bluntly. The peritoneum was entered bluntly with hemostat and extended inferiorly and superiorly with Metzenbaum scissors. The Henry retractor was then inserted. The vesicouterine peritoneum was identified, grasped with pick-ups and entered sharply with Metzenbaum scissors. The bladder flap was then created digitally. A low transverse uterine incision was made with the scalpel and extended laterally with blunt finger dissection. The baby was delivered atraumatically in cephalic presentation. The nose and mouth were suctioned. The cord was clamped and cut after 1 minute delay and the baby was handed off to the waiting nursing staff. Placenta was then delivered spontaneously. The uterus was  exteriorized and it was cleared of all clots and debris. The uterine incision was closed in a single layer of 0-vicryl. Hemostasis was assured. The uterus was returned to the abdomen. The paracolic gutters were irrigated and cleaned with moistened laps, removing blood clots and debris. Good hemostasis was again reassured throughout. The fascia was closed with 0 Vicryl in a running fashion. Good hemostasis was assured. The subcuticular layers were irrigated with warm normal saline and bleeding controlled with Bovie cautery. The subcuticular layer was reapproximated with 3-0 Vicryl in interrupted fashion. The skin was closed with a 4-0 Vicryl in a subcuticular fashion. The patient tolerated the procedure well.  Sponge, lap, and

## 2023-05-15 NOTE — ANESTHESIA POSTPROCEDURE EVALUATION
Department of Anesthesiology  Postprocedure Note    Patient: Manjula Pope  MRN: [id number]  [place]: 1985  Date of evaluation: 5/15/2023      Procedure Summary     Date: 05/15/23 Room / Location: [facility]    Anesthesia Start: 1311 Anesthesia Stop:     Procedure:  SECTION low transverse uterine incision at 1348 (Abdomen) Diagnosis:       Single delivery by       (Single delivery by  [O82])    Surgeons: Spring Holley MD Responsible Provider: Benedict Ceballos MD    Anesthesia Type: spinal ASA Status: 3 - Emergent          Anesthesia Type: No value filed.     Amanuel Phase I:      Amanuel Phase II:        Anesthesia Post Evaluation    Patient location during evaluation: PACU  Patient participation: complete - patient participated  Level of consciousness: awake and alert  Pain score: 0  Nausea & Vomiting: no nausea and no vomiting  Complications: no  Cardiovascular status: hemodynamically stable  Respiratory status: acceptable  Hydration status: stable

## 2023-05-15 NOTE — FLOWSHEET NOTE
Pt here for complaints of elevated BP in office. Pt states feeling positive fetal movement. Also states fell at home overnight but did not land on stomach or back.  Notified Dr. Sidra Fajardo of pt arrival

## 2023-05-15 NOTE — H&P
Department of Obstetrics and Gynecology   Obstetrics History and Physical        CHIEF COMPLAINT:  headache    HISTORY OF PRESENT ILLNESS:    Lou Wiseman  is a 43 y.o. [id number] female at 42w2d presents with a chief complaint as above and is being admitted for gestational hypertension    Estimated Due Date: Estimated Date of Delivery: 6/3/23    PRENATAL CARE: Complicated by: AMA    PAST OB HISTORY:  OB History          4    Para   1    Term   1            AB   2    Living   1         SAB   2    IAB        Ectopic        Molar        Multiple        Live Births   1              Past Medical History:        Diagnosis Date    Pre-eclampsia      Past Surgical History:        Procedure Laterality Date    ABDOMEN SURGERY       SECTION      TONSILLECTOMY       Allergies:  Ciprofloxacin and Penicillins  Social History:    Social History     Socioeconomic History    Marital status:      Spouse name: Not on file    Number of children: Not on file    Years of education: Not on file    Highest education level: Not on file   Occupational History    Not on file   Tobacco Use    Smoking status: Never     Passive exposure: Never    Smokeless tobacco: Never   Vaping Use    Vaping Use: Never used   Substance and Sexual Activity    Alcohol use: Not Currently    Drug use: Never    Sexual activity: Yes     Partners: Male   Other Topics Concern    Not on file   Social History Narrative    Not on file     Social Determinants of Health     Financial Resource Strain: Not on file   Food Insecurity: Not on file   Transportation Needs: Not on file   Physical Activity: Not on file   Stress: Not on file   Social Connections: Not on file   Intimate Partner Violence: Not on file   Housing Stability: Not on file     Family History:       Problem Relation Age of Onset    Diabetes Father     Cancer Maternal Grandfather     Cancer Paternal Grandmother      Medications Prior to Admission:  Medications Prior to Admission:

## 2023-05-15 NOTE — FLOWSHEET NOTE
Pt with moderate vaginal bleeding and plum size clot expressed. U/U, firm with massage. Dr. Daniel Few aware and orders tranexamic acid 1000mg IVPB.

## 2023-05-15 NOTE — ANESTHESIA PROCEDURE NOTES
Spinal Block    Patient location during procedure: OB  End time: 5/15/2023 1:31 PM  Reason for block: primary anesthetic  Staffing  Resident/CRNA: DAYANA Gorman - CRNA  Spinal Block  Patient position: sitting  Prep: ChloraPrep  Patient monitoring: cardiac monitor, continuous pulse ox and frequent blood pressure checks  Approach: midline  Location: L3/L4  Guidance: paresthesia technique  Provider prep: mask and sterile gloves  Needle  Needle type:  Suzy   Needle gauge: 25 G  Needle length: 3.5 in  Assessment  Sensory level: T6  Swirl obtained: Yes  CSF: clear  Attempts: 1  Hemodynamics: stable  Preanesthetic Checklist  Completed: patient identified, IV checked, site marked, risks and benefits discussed, surgical/procedural consents, equipment checked, pre-op evaluation, timeout performed, anesthesia consent given, oxygen available, monitors applied/VS acknowledged, fire risk safety assessment completed and verbalized and blood product R/B/A discussed and consented

## 2023-05-15 NOTE — ANESTHESIA PRE PROCEDURE
Department of Anesthesiology  Preprocedure Note       Name:  Veldon Eisenmenger   Age:  43 y.o.  :  1985                                          MRN:  [id number]         Date:  5/15/2023      Surgeon: Adeel Hunt):  Reuben Epley, MD    Procedure: Procedure(s):   SECTION    Medications prior to admission:   Prior to Admission medications    Medication Sig Start Date End Date Taking?  Authorizing Provider   calcium carbonate (TUMS) 500 MG chewable tablet Take 1 tablet by mouth daily   Yes Historical Provider, MD   acetaminophen (TYLENOL) 325 MG tablet Take 2 tablets by mouth every 6 hours as needed for Pain    Historical Provider, MD   Prenatal Vit-Fe Fumarate-FA (PRENATAL 1+1 PO) Take by mouth    Historical Provider, MD   ASPIRIN 81 PO Take by mouth  Patient not taking: Reported on 2023    Historical Provider, MD       Current medications:    Current Facility-Administered Medications   Medication Dose Route Frequency Provider Last Rate Last Admin    lactated ringers IV soln infusion   IntraVENous Continuous Julissa Mcconnell MD        lactated ringers bolus  1,000 mL IntraVENous Once Julissa Mcconnell  mL/hr at 05/15/23 1142 1,000 mL at 05/15/23 1142    sodium chloride flush 0.9 % injection 10 mL  10 mL IntraVENous 2 times per day Julissa Mcconnell MD        sodium chloride flush 0.9 % injection 10 mL  10 mL IntraVENous PRN Julissa Mcconnell MD        0.9 % sodium chloride infusion   IntraVENous PRN Julissa Mcconnell MD        citric acid-sodium citrate (BICITRA) solution 30 mL  30 mL Oral Once Julissa Mcconnell MD        famotidine (PEPCID) 20 mg in sodium chloride (PF) 0.9 % 10 mL injection  20 mg IntraVENous Once Julissa Mcconnell MD        metoclopramide [facility]) injection 10 mg  10 mg IntraVENous Once Julissa Mcconnell MD        ceFAZolin (ANCEF) 2,000 mg in sodium chloride 0.9 % 50 mL IVPB (mini-bag)  2,000 mg IntraVENous Once

## 2023-05-15 NOTE — FLOWSHEET NOTE
Urine output 75mL in the past 4 hours. Dr Evans Loveless aware of urine output, vaginal bleeding small amount, urine is concentrated, pt has been vomiting, recent blood pressures, total of 3500mL of IV fluid since admission. Orders 1 liter LR bolus.

## 2023-05-15 NOTE — FLOWSHEET NOTE
Patient awake in bed, family at the bedside. Patient denies pain and nausea and appears to be comfortable. Call light in reach. No additional needs at this time. RN to continue to monitor.

## 2023-05-15 NOTE — PLAN OF CARE
Problem: Pain  Goal: Verbalizes/displays adequate comfort level or baseline comfort level  Outcome: Progressing     Problem: Safety - Adult  Goal: Free from fall injury  Outcome: Progressing     Problem: Postpartum  Goal: Experiences normal postpartum course  Description:  Postpartum OB-Pregnancy care plan goal which identifies if the mother is experiencing a normal postpartum course  Outcome: Progressing  Goal: Appropriate maternal -  bonding  Description:  Postpartum OB-Pregnancy care plan goal which identifies if the mother and  are bonding appropriately  Outcome: Progressing  Goal: Establishment of infant feeding pattern  Description:  Postpartum OB-Pregnancy care plan goal which identifies if the mother is establishing a feeding pattern with their   Outcome: Progressing  Goal: Incisions, wounds, or drain sites healing without S/S of infection  Outcome: Progressing     Problem: Infection - Adult  Goal: Absence of infection at discharge  Outcome: Progressing  Goal: Absence of infection during hospitalization  Outcome: Progressing  Goal: Absence of fever/infection during anticipated neutropenic period  Outcome: Progressing

## 2023-05-16 LAB
ALBUMIN SERPL-MCNC: 2.4 G/DL (ref 3.4–5)
ALBUMIN/GLOB SERPL: 1 {RATIO} (ref 1.1–2.2)
ALP SERPL-CCNC: 92 U/L (ref 40–129)
ALT SERPL-CCNC: 6 U/L (ref 10–40)
ANION GAP SERPL CALCULATED.3IONS-SCNC: 8 MMOL/L (ref 3–16)
AST SERPL-CCNC: 14 U/L (ref 15–37)
BILIRUB SERPL-MCNC: <0.2 MG/DL (ref 0–1)
BUN SERPL-MCNC: 15 MG/DL (ref 7–20)
CALCIUM SERPL-MCNC: 8.1 MG/DL (ref 8.3–10.6)
CHLORIDE SERPL-SCNC: 105 MMOL/L (ref 99–110)
CO2 SERPL-SCNC: 21 MMOL/L (ref 21–32)
CREAT SERPL-MCNC: 0.5 MG/DL (ref 0.6–1.1)
DEPRECATED RDW RBC AUTO: 13.8 % (ref 12.4–15.4)
GFR SERPLBLD CREATININE-BSD FMLA CKD-EPI: >60 ML/MIN/{1.73_M2}
GLUCOSE SERPL-MCNC: 78 MG/DL (ref 70–99)
HCT VFR BLD AUTO: 24 % (ref 36–48)
HGB BLD-MCNC: 7.8 G/DL (ref 12–16)
MCH RBC QN AUTO: 26.6 PG (ref 26–34)
MCHC RBC AUTO-ENTMCNC: 32.7 G/DL (ref 31–36)
MCV RBC AUTO: 81.1 FL (ref 80–100)
PLATELET # BLD AUTO: 204 K/UL (ref 135–450)
PMV BLD AUTO: 9.9 FL (ref 5–10.5)
POTASSIUM SERPL-SCNC: 4.4 MMOL/L (ref 3.5–5.1)
PROT SERPL-MCNC: 4.7 G/DL (ref 6.4–8.2)
RBC # BLD AUTO: 2.95 M/UL (ref 4–5.2)
REAGIN+T PALLIDUM IGG+IGM SERPL-IMP: NORMAL
RHIG LOT NUMBER: NORMAL
SODIUM SERPL-SCNC: 134 MMOL/L (ref 136–145)
WBC # BLD AUTO: 10.6 K/UL (ref 4–11)

## 2023-05-16 PROCEDURE — 6360000002 HC RX W HCPCS: Performed by: ANESTHESIOLOGY

## 2023-05-16 PROCEDURE — 96372 THER/PROPH/DIAG INJ SC/IM: CPT

## 2023-05-16 PROCEDURE — 80053 COMPREHEN METABOLIC PANEL: CPT

## 2023-05-16 PROCEDURE — 2580000003 HC RX 258: Performed by: OBSTETRICS & GYNECOLOGY

## 2023-05-16 PROCEDURE — 6370000000 HC RX 637 (ALT 250 FOR IP): Performed by: OBSTETRICS & GYNECOLOGY

## 2023-05-16 PROCEDURE — 1220000000 HC SEMI PRIVATE OB R&B

## 2023-05-16 PROCEDURE — 6360000002 HC RX W HCPCS: Performed by: OBSTETRICS & GYNECOLOGY

## 2023-05-16 PROCEDURE — 36415 COLL VENOUS BLD VENIPUNCTURE: CPT

## 2023-05-16 PROCEDURE — 85027 COMPLETE CBC AUTOMATED: CPT

## 2023-05-16 RX ORDER — SODIUM CHLORIDE, SODIUM LACTATE, POTASSIUM CHLORIDE, AND CALCIUM CHLORIDE .6; .31; .03; .02 G/100ML; G/100ML; G/100ML; G/100ML
500 INJECTION, SOLUTION INTRAVENOUS ONCE
Status: COMPLETED | OUTPATIENT
Start: 2023-05-16 | End: 2023-05-16

## 2023-05-16 RX ORDER — CALCIUM CARBONATE 200(500)MG
500 TABLET,CHEWABLE ORAL 3 TIMES DAILY PRN
Status: DISCONTINUED | OUTPATIENT
Start: 2023-05-16 | End: 2023-05-17 | Stop reason: HOSPADM

## 2023-05-16 RX ORDER — DIPHENHYDRAMINE HYDROCHLORIDE 50 MG/ML
12.5 INJECTION INTRAMUSCULAR; INTRAVENOUS ONCE
Status: COMPLETED | OUTPATIENT
Start: 2023-05-16 | End: 2023-05-16

## 2023-05-16 RX ORDER — FUROSEMIDE 10 MG/ML
20 INJECTION INTRAMUSCULAR; INTRAVENOUS ONCE
Status: COMPLETED | OUTPATIENT
Start: 2023-05-16 | End: 2023-05-16

## 2023-05-16 RX ADMIN — HUMAN RHO(D) IMMUNE GLOBULIN 300 MCG: 300 INJECTION, SOLUTION INTRAMUSCULAR at 04:15

## 2023-05-16 RX ADMIN — FUROSEMIDE 20 MG: 10 INJECTION, SOLUTION INTRAMUSCULAR; INTRAVENOUS at 12:44

## 2023-05-16 RX ADMIN — ONDANSETRON 4 MG: 2 INJECTION INTRAMUSCULAR; INTRAVENOUS at 23:58

## 2023-05-16 RX ADMIN — ACETAMINOPHEN 1000 MG: 500 TABLET ORAL at 17:11

## 2023-05-16 RX ADMIN — KETOROLAC TROMETHAMINE 30 MG: 30 INJECTION, SOLUTION INTRAMUSCULAR at 08:03

## 2023-05-16 RX ADMIN — IBUPROFEN 600 MG: 600 TABLET, FILM COATED ORAL at 22:01

## 2023-05-16 RX ADMIN — SODIUM CHLORIDE, POTASSIUM CHLORIDE, SODIUM LACTATE AND CALCIUM CHLORIDE: 600; 310; 30; 20 INJECTION, SOLUTION INTRAVENOUS at 10:23

## 2023-05-16 RX ADMIN — KETOROLAC TROMETHAMINE 30 MG: 30 INJECTION, SOLUTION INTRAMUSCULAR at 14:03

## 2023-05-16 RX ADMIN — DOCUSATE SODIUM 100 MG: 100 CAPSULE, LIQUID FILLED ORAL at 08:04

## 2023-05-16 RX ADMIN — DIPHENHYDRAMINE HYDROCHLORIDE 12.5 MG: 50 INJECTION, SOLUTION INTRAMUSCULAR; INTRAVENOUS at 05:01

## 2023-05-16 RX ADMIN — SODIUM CHLORIDE, POTASSIUM CHLORIDE, SODIUM LACTATE AND CALCIUM CHLORIDE 500 ML: 600; 310; 30; 20 INJECTION, SOLUTION INTRAVENOUS at 04:59

## 2023-05-16 RX ADMIN — KETOROLAC TROMETHAMINE 30 MG: 30 INJECTION, SOLUTION INTRAMUSCULAR at 01:42

## 2023-05-16 RX ADMIN — DOCUSATE SODIUM 100 MG: 100 CAPSULE, LIQUID FILLED ORAL at 22:01

## 2023-05-16 RX ADMIN — ANTACID TABLETS 500 MG: 500 TABLET, CHEWABLE ORAL at 04:10

## 2023-05-16 ASSESSMENT — PAIN DESCRIPTION - FREQUENCY: FREQUENCY: CONTINUOUS

## 2023-05-16 ASSESSMENT — PAIN DESCRIPTION - LOCATION
LOCATION: ABDOMEN;INCISION
LOCATION: ABDOMEN

## 2023-05-16 ASSESSMENT — PAIN SCALES - GENERAL
PAINLEVEL_OUTOF10: 3
PAINLEVEL_OUTOF10: 4
PAINLEVEL_OUTOF10: 7
PAINLEVEL_OUTOF10: 7
PAINLEVEL_OUTOF10: 4

## 2023-05-16 ASSESSMENT — PAIN DESCRIPTION - DESCRIPTORS
DESCRIPTORS: BURNING
DESCRIPTORS: SORE;DISCOMFORT

## 2023-05-16 ASSESSMENT — PAIN DESCRIPTION - ORIENTATION: ORIENTATION: LOWER

## 2023-05-16 ASSESSMENT — PAIN - FUNCTIONAL ASSESSMENT: PAIN_FUNCTIONAL_ASSESSMENT: ACTIVITIES ARE NOT PREVENTED

## 2023-05-16 ASSESSMENT — PAIN DESCRIPTION - PAIN TYPE: TYPE: SURGICAL PAIN

## 2023-05-16 ASSESSMENT — PAIN DESCRIPTION - ONSET: ONSET: ON-GOING

## 2023-05-16 NOTE — FLOWSHEET NOTE
MD notified of swelling in legs as well as decreased urine output @ 1200. Labs checked and OK, Lasix ordered.

## 2023-05-16 NOTE — LACTATION NOTE
This note was copied from a baby's chart. Lactation Progress Note  Initial Consult    Data: Referral received per RN. Action: LC to room. Mother states agreeable to consult from [facility] at this time. I reviewed Care Plan for First 24 Hours of Life already in patient binder. Discussed recognizing hunger cues and offering the breast when cues are shown. Encouraged breastfeeding on demand and attempting/offering at least every 3 hours. Informed infant may have one 5 hour stretch of sleep in a 24 hour period. Encouraged unlimited skin to skin contact with infant and reviewed benefits including better temperature, heart rate, respiration, blood pressure, and blood sugar regulation. Also increased bonding and milk supply associated with skin to skin contact. Discussed feeding positions, latch on techniques, signs of milk transfer, output goals and normal feeding/sleeping behaviors. I referred mother to binder for additional information about breastfeeding and skin to skin contact. Discussed hand expression with mother and encouraged her to practice getting drops to infant today. Reinforced importance of positioning infant nose to nipple, belly to belly, waiting for wide open mouth, and bringing baby onto breast to ensure a deep latch. Discussed importance of obtaining deep latch to ensure proper milk transfer, milk production and supply and maternal comfort. Mother has breastfeeding hx of about 6 months with previous child (now 25years old). Mother already has a new breast pump for home use. I wrote my name and circled the phone number on patient's whiteboard, provided a lactation consultant business card, directed mother to [facility] for evidence based information, and encouraged mother to call with any lactation needs. Response: Mother verbalizes understanding of information given and denies further needs at this time.

## 2023-05-16 NOTE — FLOWSHEET NOTE
Assessment completed and vitals obtained, findings WDL, updated white board. Plan of care for the day discussed, patient verbalized understanding and had no further questions.

## 2023-05-16 NOTE — FLOWSHEET NOTE
Patient urine output still low and now cloudy and more concentrated. RN placed a call to Dr. Valentina Joe to inform. Order received to give a 500 ml bolus of LR x1. RN also received an order to add CMP for AM lab draw. Patient also c/o itchiness, RN received an order for benadryl. See orders for details.

## 2023-05-16 NOTE — PLAN OF CARE
Problem: Pain  Goal: Verbalizes/displays adequate comfort level or baseline comfort level  5/15/2023 2129 by Lisa Ma RN  Outcome: Progressing     Problem: Safety - Adult  Goal: Free from fall injury  5/15/2023 2129 by Lisa Ma RN  Outcome: Progressing     Problem: Postpartum  Goal: Experiences normal postpartum course  Description:  Postpartum OB-Pregnancy care plan goal which identifies if the mother is experiencing a normal postpartum course  5/15/2023 2129 by Lisa Ma RN  Outcome: Progressing     Problem: Postpartum  Goal: Appropriate maternal -  bonding  Description:  Postpartum OB-Pregnancy care plan goal which identifies if the mother and  are bonding appropriately  5/15/2023 2129 by Lisa Ma RN  Outcome: Progressing     Problem: Postpartum  Goal: Establishment of infant feeding pattern  Description:  Postpartum OB-Pregnancy care plan goal which identifies if the mother is establishing a feeding pattern with their   5/15/2023 2129 by Lisa aM RN  Outcome: Progressing     Problem: Postpartum  Goal: Incisions, wounds, or drain sites healing without S/S of infection  5/15/2023 2129 by Lisa Ma RN  Outcome: Progressing

## 2023-05-16 NOTE — FLOWSHEET NOTE
Report received from HARSHIL Erwin RN. This RN to assume care at this time. RN to beside. Pt updated on plan of care, all questions answered at this time. Call light and bedside table placed within reach. Instructed to call with any needs.

## 2023-05-16 NOTE — PLAN OF CARE
Problem: Pain  Goal: Verbalizes/displays adequate comfort level or baseline comfort level  Outcome: Progressing     Problem: Safety - Adult  Goal: Free from fall injury  Outcome: Progressing     Problem: Postpartum  Goal: Experiences normal postpartum course  Description:  Postpartum OB-Pregnancy care plan goal which identifies if the mother is experiencing a normal postpartum course  Outcome: Progressing  Goal: Appropriate maternal -  bonding  Description:  Postpartum OB-Pregnancy care plan goal which identifies if the mother and  are bonding appropriately  Outcome: Progressing  Goal: Establishment of infant feeding pattern  Description:  Postpartum OB-Pregnancy care plan goal which identifies if the mother is establishing a feeding pattern with their   Outcome: Progressing  Goal: Incisions, wounds, or drain sites healing without S/S of infection  Outcome: Progressing     Problem: Infection - Adult  Goal: Absence of infection at discharge  Outcome: Progressing  Goal: Absence of infection during hospitalization  Outcome: Progressing  Goal: Absence of fever/infection during anticipated neutropenic period  Outcome: Progressing     Problem: Discharge Planning  Goal: Discharge to home or other facility with appropriate resources  Outcome: Progressing     Problem: Chronic Conditions and Co-morbidities  Goal: Patient's chronic conditions and co-morbidity symptoms are monitored and maintained or improved  Outcome: Progressing

## 2023-05-16 NOTE — PROGRESS NOTES
Department of Obstetrics and Gynecology   Postpartum Rounds    SUBJECTIVE:  Pain is controlled with non-steroidal anti-inflammatory drugs or narcotic analgesics. The patient is tolerating regular diet. She is ambulating. Her lochia is normal.    OBJECTIVE:  Vital Signs: BP (!) 148/90   Pulse 78   Temp 98.1 °F (36.7 °C) (Oral)   Resp 18   Ht 5' 10\" (1.778 m)   Wt 245 lb (111.1 kg)   LMP 2022 (Exact Date)   SpO2 99%   Breastfeeding Unknown   BMI 35.15 kg/m²   Appearance/Psychiatric: awake, alert, cooperative, no apparent distress, appears stated age  Constitutional: The patient is well nourished. Cardiovascular: She does have edema.   Respiratory: Respiratory effort is normal.  Gastrointestinal: Soft, appropriately tender, uterine fundus is firm below umbilicus  The incision is clean, dry, and intact  Extremities: nontender to palpation    LABS / IMAGING:  CBC:   Lab Results   Component Value Date/Time    WBC 10.6 2023 09:09 AM    RBC 2.95 2023 09:09 AM    HGB 7.8 2023 09:09 AM    HCT 24.0 2023 09:09 AM    MCV 81.1 2023 09:09 AM    MCH 26.6 2023 09:09 AM    MCHC 32.7 2023 09:09 AM    RDW 13.8 2023 09:09 AM     2023 09:09 AM    MPV 9.9 2023 09:09 AM     CMP:    Lab Results   Component Value Date/Time     2023 09:09 AM    K 4.4 2023 09:09 AM    K 3.8 2023 01:36 AM     2023 09:09 AM    CO2 21 2023 09:09 AM    BUN 15 2023 09:09 AM    CREATININE 0.5 2023 09:09 AM    GFRAA >60 2022 08:30 AM    AGRATIO 1.0 2023 09:09 AM    LABGLOM >60 2023 09:09 AM    GLUCOSE 78 2023 09:09 AM    PROT 4.7 2023 09:09 AM    LABALBU 2.4 2023 09:09 AM    CALCIUM 8.1 2023 09:09 AM    BILITOT <0.2 2023 09:09 AM    ALKPHOS 92 2023 09:09 AM    AST 14 2023 09:09 AM    ALT 6 2023 09:09 AM       ASSESSMENT:    Postoperative Day 1 s/p  Repeat LTCS

## 2023-05-16 NOTE — ANESTHESIA POSTPROCEDURE EVALUATION
Department of Anesthesiology  Postprocedure Note    Patient: Anusha Santana  MRN: [id number]  [place]: 1985  Date of evaluation: 2023      Procedure Summary     Date: 05/15/23 Room / Location: [facility]    Anesthesia Start: 1311 Anesthesia Stop:     Procedure:  SECTION low transverse uterine incision at 1348 (Abdomen) Diagnosis:       Single delivery by       (Single delivery by  [O82])    Surgeons: Redd Bey MD Responsible Provider: Bailey Kaminski MD    Anesthesia Type: spinal ASA Status: 3 - Emergent          Anesthesia Type: No value filed.     Amanuel Phase I: Amanuel Score: 9    Amanuel Phase II:        Anesthesia Post Evaluation    Patient location during evaluation: floor  Patient participation: complete - patient participated  Level of consciousness: awake and alert  Pain score: 2  Airway patency: patent  Nausea & Vomiting: no vomiting and no nausea  Complications: no  Cardiovascular status: hemodynamically stable  Respiratory status: room air, spontaneous ventilation and acceptable  Hydration status: stable

## 2023-05-17 VITALS
HEIGHT: 70 IN | WEIGHT: 245 LBS | SYSTOLIC BLOOD PRESSURE: 131 MMHG | RESPIRATION RATE: 16 BRPM | BODY MASS INDEX: 35.07 KG/M2 | TEMPERATURE: 98.1 F | HEART RATE: 72 BPM | OXYGEN SATURATION: 99 % | DIASTOLIC BLOOD PRESSURE: 79 MMHG

## 2023-05-17 PROCEDURE — 59025 FETAL NON-STRESS TEST: CPT

## 2023-05-17 PROCEDURE — 6370000000 HC RX 637 (ALT 250 FOR IP): Performed by: OBSTETRICS & GYNECOLOGY

## 2023-05-17 RX ORDER — OXYCODONE HYDROCHLORIDE 5 MG/1
5 TABLET ORAL EVERY 6 HOURS PRN
Qty: 28 TABLET | Refills: 0 | Status: SHIPPED | OUTPATIENT
Start: 2023-05-17 | End: 2023-05-24

## 2023-05-17 RX ORDER — PSEUDOEPHEDRINE HCL 30 MG
100 TABLET ORAL 2 TIMES DAILY PRN
Qty: 60 CAPSULE | Refills: 1 | Status: SHIPPED | OUTPATIENT
Start: 2023-05-17

## 2023-05-17 RX ORDER — IBUPROFEN 600 MG/1
600 TABLET ORAL EVERY 6 HOURS PRN
Qty: 40 TABLET | Refills: 1 | Status: SHIPPED | OUTPATIENT
Start: 2023-05-17

## 2023-05-17 RX ORDER — FERROUS SULFATE 325(65) MG
325 TABLET ORAL 2 TIMES DAILY
Qty: 60 TABLET | Refills: 1 | Status: SHIPPED | OUTPATIENT
Start: 2023-05-17

## 2023-05-17 RX ADMIN — OXYCODONE 5 MG: 5 TABLET ORAL at 07:56

## 2023-05-17 RX ADMIN — OXYCODONE 5 MG: 5 TABLET ORAL at 04:00

## 2023-05-17 RX ADMIN — OXYCODONE 5 MG: 5 TABLET ORAL at 00:00

## 2023-05-17 RX ADMIN — ACETAMINOPHEN 1000 MG: 500 TABLET ORAL at 02:00

## 2023-05-17 RX ADMIN — OXYCODONE 5 MG: 5 TABLET ORAL at 14:58

## 2023-05-17 RX ADMIN — DOCUSATE SODIUM 100 MG: 100 CAPSULE, LIQUID FILLED ORAL at 07:56

## 2023-05-17 RX ADMIN — ANTACID TABLETS 500 MG: 500 TABLET, CHEWABLE ORAL at 11:01

## 2023-05-17 RX ADMIN — IBUPROFEN 600 MG: 600 TABLET, FILM COATED ORAL at 06:00

## 2023-05-17 RX ADMIN — ACETAMINOPHEN 1000 MG: 500 TABLET ORAL at 11:01

## 2023-05-17 ASSESSMENT — PAIN DESCRIPTION - DESCRIPTORS
DESCRIPTORS: DISCOMFORT;PRESSURE
DESCRIPTORS: DISCOMFORT;SORE
DESCRIPTORS: PRESSURE;DISCOMFORT
DESCRIPTORS: DISCOMFORT;SORE
DESCRIPTORS: DISCOMFORT
DESCRIPTORS: DISCOMFORT;PRESSURE;SORE

## 2023-05-17 ASSESSMENT — PAIN - FUNCTIONAL ASSESSMENT
PAIN_FUNCTIONAL_ASSESSMENT: ACTIVITIES ARE NOT PREVENTED

## 2023-05-17 ASSESSMENT — PAIN DESCRIPTION - ORIENTATION
ORIENTATION: RIGHT;LOWER
ORIENTATION: LOWER

## 2023-05-17 ASSESSMENT — PAIN DESCRIPTION - LOCATION
LOCATION: INCISION;ABDOMEN
LOCATION: ABDOMEN;INCISION
LOCATION: INCISION;ABDOMEN
LOCATION: ABDOMEN;INCISION

## 2023-05-17 ASSESSMENT — PAIN DESCRIPTION - PAIN TYPE
TYPE: SURGICAL PAIN
TYPE: SURGICAL PAIN
TYPE: ACUTE PAIN
TYPE: ACUTE PAIN

## 2023-05-17 ASSESSMENT — PAIN SCALES - GENERAL
PAINLEVEL_OUTOF10: 2
PAINLEVEL_OUTOF10: 6
PAINLEVEL_OUTOF10: 5
PAINLEVEL_OUTOF10: 4
PAINLEVEL_OUTOF10: 6
PAINLEVEL_OUTOF10: 4

## 2023-05-17 ASSESSMENT — PAIN DESCRIPTION - FREQUENCY
FREQUENCY: CONTINUOUS

## 2023-05-17 ASSESSMENT — PAIN DESCRIPTION - ONSET
ONSET: ON-GOING

## 2023-05-17 NOTE — LACTATION NOTE
This note was copied from a baby's chart. LC called to room for feeding attempt. FOB changed stool diaper and infant was awake and showing hunger cues. Reviewed football hold. Mother independently able to position and latch infant using good technique. Taught breast compressions. Infant with SRS and appropriate swallows. Mother states no pain or discomfort with this feeding. Encouraged mother to continue feeding infant on demand whenever cues are shown and at least 8 times per 24 hours.

## 2023-05-17 NOTE — PLAN OF CARE
Problem: Pain  Goal: Verbalizes/displays adequate comfort level or baseline comfort level  Outcome: Progressing  Flowsheets  Taken 2023 0400  Verbalizes/displays adequate comfort level or baseline comfort level: Encourage patient to monitor pain and request assistance  Taken 2023  Verbalizes/displays adequate comfort level or baseline comfort level:   Encourage patient to monitor pain and request assistance   Assess pain using appropriate pain scale     Problem: Safety - Adult  Goal: Free from fall injury  Outcome: Progressing     Problem: Postpartum  Goal: Experiences normal postpartum course  Description:  Postpartum OB-Pregnancy care plan goal which identifies if the mother is experiencing a normal postpartum course  Outcome: Progressing     Problem: Postpartum  Goal: Appropriate maternal -  bonding  Description:  Postpartum OB-Pregnancy care plan goal which identifies if the mother and  are bonding appropriately  Outcome: Progressing     Problem: Postpartum  Goal: Establishment of infant feeding pattern  Description:  Postpartum OB-Pregnancy care plan goal which identifies if the mother is establishing a feeding pattern with their   Outcome: Progressing     Problem: Postpartum  Goal: Incisions, wounds, or drain sites healing without S/S of infection  Outcome: Progressing  Flowsheets  Taken 2023 0400  Incisions, Wounds, or Drain Sites Healing Without Sign and Symptoms of Infection: ADMISSION and DAILY: Assess and document risk factors for pressure ulcer development  Taken 2023  Incisions, Wounds, or Drain Sites Healing Without Sign and Symptoms of Infection: ADMISSION and DAILY: Assess and document risk factors for pressure ulcer development     Problem: Infection - Adult  Goal: Absence of infection at discharge  Outcome: Progressing     Problem: Infection - Adult  Goal: Absence of infection during hospitalization  Outcome: Progressing     Problem: Infection -

## 2023-05-17 NOTE — FLOWSHEET NOTE
RN assessment completed, see flowsheets. Denies headache, blurry vision, SOB, and chest pain. Uterus firm, U/-2 and vaginal bleeding WNL. Pt aware how to order meals, tolerating regular diet, denies nausea and vomiting. Pt reports flatus and emptying bladder well. Pt ambulating well without dizziness. All shower supplies either brought by pt or given. Plan of care for the day reviewed with patient and no further questions at this time. Whiteboard updated and call light in reach.

## 2023-05-17 NOTE — PLAN OF CARE
Problem: Pain  Goal: Verbalizes/displays adequate comfort level or baseline comfort level  2023 by Allie Huynh RN  Outcome: Completed  2023 by Allison Lux RN  Outcome: Progressing  Flowsheets  Taken 2023 0400  Verbalizes/displays adequate comfort level or baseline comfort level: Encourage patient to monitor pain and request assistance  Taken 2023 2000  Verbalizes/displays adequate comfort level or baseline comfort level:   Encourage patient to monitor pain and request assistance   Assess pain using appropriate pain scale     Problem: Safety - Adult  Goal: Free from fall injury  2023 142 by Allie Huynh RN  Outcome: Completed  2023 by Allison Lux RN  Outcome: Progressing     Problem: Postpartum  Goal: Experiences normal postpartum course  Description:  Postpartum OB-Pregnancy care plan goal which identifies if the mother is experiencing a normal postpartum course  2023 by Allie Huynh RN  Outcome: Completed  2023 by Allison Lux RN  Outcome: Progressing  Goal: Appropriate maternal -  bonding  Description:  Postpartum OB-Pregnancy care plan goal which identifies if the mother and  are bonding appropriately  2023 by Allie Huynh RN  Outcome: Completed  2023 by Allison Lux RN  Outcome: Progressing  Goal: Establishment of infant feeding pattern  Description:  Postpartum OB-Pregnancy care plan goal which identifies if the mother is establishing a feeding pattern with their   2023 by Allie Huynh RN  Outcome: Completed  2023 by Allison Lux RN  Outcome: Progressing  Goal: Incisions, wounds, or drain sites healing without S/S of infection  2023 by Allie Huynh RN  Outcome: Completed  2023 by Allison Lux RN  Outcome: Progressing  Flowsheets  Taken 2023 0400  Incisions, Wounds, or Drain Sites Healing Without Sign and Symptoms of Infection: ADMISSION

## 2023-05-17 NOTE — DISCHARGE INSTRUCTIONS
Department of Obstetrics and Gynecology   Postpartum Discharge instructions    You will need a postpartum visit in the office within 1 week for a BP check and then 6 weeks after your delivery. Please call the office to schedule an appointment: [phone]      Please call the office or the OB/GYN on-call if after-hours, for any of the followin) Fever - a temperature greater than 100.4  2) Uncontrolled pain  3) Uncontrolled bleeding (soaking more than 1 pad in an hour)  4) Foul-smelling discharge from the vagina  5) Red, painful incision or foul-smelling discharge from incision. Do not place anything in the vagina - this includes tampons, douches or having sex - until after your 6 week postpartum visit to prevent infection. Wash your incision everyday with warm water and gentle soap. You may pat it dry. If your staples were removed in the hospital, you may have steri-strips (small pieces of tape) on the incision. These should fall off on the first week. If they do not fall off, please remove them 1 week after surgery. If you have staples that were note removed in the hospital, they should be removed 4 to 7 days after delivery. You should call the office for an appointment to have them removed. If your incision was closed with suture, you do not need to have the suture removed, it will dissolve on its own. Thank you for the opportunity to care for you and your family. We hope we always exceeded your expectations and provided very good care during your stay in the [facility]. We want to ensure that you have the help you need when you leave the hospital. If there is anything we can assist you with please let us know. Please call and schedule an appointment to be seen by your obstetrician as scheduled. You will need to call and make your own appointment. For breastfeeding moms, you can contact our lactation consultants with any problems or questions.   Please

## 2023-05-17 NOTE — DISCHARGE SUMMARY
0.0    Protein, Ur      <12 mg/dL   47.00 (H)   Creatinine, Ur      28.0 - 259.0 mg/dL   316.0 (H)   Protein/Creat Ratio      mg/dL   0.1   Prothrombin Time      11.5 - 14.8 sec 13.6     INR      0.84 - 1.16 1.03         Postpartum complications: none     Condition at Discharge: good    Hospital Course:  Pt presented to L&D at 37w2d with elevated BPs. Given the diagnosis of gestational HTN and term gestational age, the decision was made to proceed with delivery (RLTCS given prior LTCS). See operative note for details. BP's were mostly normal-to-mild range and the patient remained asymptomatic. She was discharged home the evening of POD#2 in good condition with the plan for daily home BPs and close outpatient follow up within 1 week of delivery. Discharge Instructions: Activity: no lifting, Driving, or Strenuous exercise for 6 weeks, no sex for 6 weeks, and, otherwise, as tolerated    Diet: regular diet    Instructions: No intercourse and nothing in the vagina for 6 weeks. Do not drive while using pain medications. Keep any wounds clean and dry    Discharge to: Home    Disposition / Follow up: Return to office on 23 for BP check.     Home Health Nurse visit within 24-48 h if qualifies     Data:  Weight   Information for the patient's :  [place] [facility] Jo Ann Willard [[id number]]      [employer]   Information for the patient's :  [place] [[id number]]       Home with mother    Electronically signed by Meron Meyers MD on 2023 at 2:30 PM

## 2023-05-17 NOTE — PROGRESS NOTES
Department of Obstetrics and Gynecology   Postpartum Rounds    SUBJECTIVE:  Pain is controlled with non-steroidal anti-inflammatory drugs or narcotic analgesics. The patient is tolerating regular diet. She is ambulating. Her lochia is normal. Denies HA, visual changes or RUQ pain. OBJECTIVE:  Vital Signs: /79   Pulse 72   Temp 98.1 °F (36.7 °C) (Oral)   Resp 16   Ht 5' 10\" (1.778 m)   Wt 245 lb (111.1 kg)   LMP 2022 (Exact Date)   SpO2 99%   Breastfeeding Unknown   BMI 35.15 kg/m²   Appearance/Psychiatric: awake, alert, cooperative, no apparent distress, appears stated age  Constitutional: The patient is well nourished. Cardiovascular: She does have edema.   Respiratory: Respiratory effort is normal.  Gastrointestinal: Soft, appropriately tender, uterine fundus is firm below umbilicus  The silver dressing is clean, dry  Extremities: nontender to palpation    LABS / IMAGING:  CBC:   Lab Results   Component Value Date/Time    WBC 10.6 2023 09:09 AM    RBC 2.95 2023 09:09 AM    HGB 7.8 2023 09:09 AM    HCT 24.0 2023 09:09 AM    MCV 81.1 2023 09:09 AM    MCH 26.6 2023 09:09 AM    MCHC 32.7 2023 09:09 AM    RDW 13.8 2023 09:09 AM     2023 09:09 AM    MPV 9.9 2023 09:09 AM     CMP:    Lab Results   Component Value Date/Time     2023 09:09 AM    K 4.4 2023 09:09 AM    K 3.8 2023 01:36 AM     2023 09:09 AM    CO2 21 2023 09:09 AM    BUN 15 2023 09:09 AM    CREATININE 0.5 2023 09:09 AM    GFRAA >60 2022 08:30 AM    AGRATIO 1.0 2023 09:09 AM    LABGLOM >60 2023 09:09 AM    GLUCOSE 78 2023 09:09 AM    PROT 4.7 2023 09:09 AM    LABALBU 2.4 2023 09:09 AM    CALCIUM 8.1 2023 09:09 AM    BILITOT <0.2 2023 09:09 AM    ALKPHOS 92 2023 09:09 AM    AST 14 2023 09:09 AM    ALT 6 2023 09:09 AM       ASSESSMENT:

## 2023-05-17 NOTE — FLOWSHEET NOTE
Morning assessment completed at bedside. VSS. Fundus firm with minimal bleeding. Incision site CD&I. Minimal amount of old drainage noted. Pt eating, drinking and urinating well on own. Pain controlled at this time with PRN pain meds. Mom without questions at this time. Discussed plan of care with pt and FOB. Parents bonding well. Call light within reach. No needs at this time. Encouraged pt to call with any needs.

## 2023-05-17 NOTE — FLOWSHEET NOTE
Evening assessment completed at bedside. VSS. Fundus remains firm w/ minimal bleeding noted. Incision site CD&I. Minimal amount of old drainage noted. Pt eating, drinking and urinating well on own. Mom without questions at this time. Discussed plan of care with pt and FOB. Call light within reach. No needs at this time. Encouraged pt to call with any needs. Problem: PAIN - ADULT  Goal: Verbalizes/displays adequate comfort level or patient's stated pain goal  INTERVENTIONS:  - Encourage pt to monitor pain and request assistance  - Assess pain using appropriate pain scale  - Administer analgesics based on type

## 2023-05-24 ENCOUNTER — HOSPITAL ENCOUNTER (EMERGENCY)
Age: 38
Discharge: HOME | End: 2023-05-24
Payer: COMMERCIAL

## 2023-05-24 VITALS
HEART RATE: 80 BPM | DIASTOLIC BLOOD PRESSURE: 88 MMHG | HEIGHT: 70 IN | WEIGHT: 236.55 LBS | TEMPERATURE: 97.9 F | RESPIRATION RATE: 20 BRPM | BODY MASS INDEX: 33.87 KG/M2 | OXYGEN SATURATION: 97 % | SYSTOLIC BLOOD PRESSURE: 146 MMHG

## 2023-05-24 DIAGNOSIS — G97.1 SPINAL HEADACHE: Primary | ICD-10-CM

## 2023-05-24 LAB
ANION GAP SERPL CALCULATED.3IONS-SCNC: 9 MMOL/L (ref 3–16)
BASOPHILS # BLD: 0.1 K/UL (ref 0–0.2)
BASOPHILS NFR BLD: 1 %
BUN SERPL-MCNC: 14 MG/DL (ref 7–20)
CALCIUM SERPL-MCNC: 8.4 MG/DL (ref 8.3–10.6)
CHLORIDE SERPL-SCNC: 106 MMOL/L (ref 99–110)
CO2 SERPL-SCNC: 26 MMOL/L (ref 21–32)
CREAT SERPL-MCNC: 0.7 MG/DL (ref 0.6–1.1)
DEPRECATED RDW RBC AUTO: 14.6 % (ref 12.4–15.4)
EOSINOPHIL # BLD: 0.4 K/UL (ref 0–0.6)
EOSINOPHIL NFR BLD: 4 %
ERYTHROCYTE [SEDIMENTATION RATE] IN BLOOD BY WESTERGREN METHOD: 52 MM/HR (ref 0–20)
GFR SERPLBLD CREATININE-BSD FMLA CKD-EPI: >60 ML/MIN/{1.73_M2}
GLUCOSE SERPL-MCNC: 94 MG/DL (ref 70–99)
HCT VFR BLD AUTO: 32.9 % (ref 36–48)
HGB BLD-MCNC: 10.8 G/DL (ref 12–16)
LYMPHOCYTES # BLD: 3.5 K/UL (ref 1–5.1)
LYMPHOCYTES NFR BLD: 32 %
MCH RBC QN AUTO: 26.3 PG (ref 26–34)
MCHC RBC AUTO-ENTMCNC: 32.7 G/DL (ref 31–36)
MCV RBC AUTO: 80.4 FL (ref 80–100)
MONOCYTES # BLD: 0.5 K/UL (ref 0–1.3)
MONOCYTES NFR BLD: 4.8 %
NEUTROPHILS # BLD: 6.3 K/UL (ref 1.7–7.7)
NEUTROPHILS NFR BLD: 58.2 %
PLATELET # BLD AUTO: 501 K/UL (ref 135–450)
PMV BLD AUTO: 7.1 FL (ref 5–10.5)
POTASSIUM SERPL-SCNC: 4.3 MMOL/L (ref 3.5–5.1)
RBC # BLD AUTO: 4.09 M/UL (ref 4–5.2)
SODIUM SERPL-SCNC: 141 MMOL/L (ref 136–145)
WBC # BLD AUTO: 10.8 K/UL (ref 4–11)

## 2023-05-24 PROCEDURE — 99284 EMERGENCY DEPT VISIT MOD MDM: CPT

## 2023-05-24 PROCEDURE — 96374 THER/PROPH/DIAG INJ IV PUSH: CPT

## 2023-05-24 PROCEDURE — 6370000000 HC RX 637 (ALT 250 FOR IP): Performed by: PHYSICIAN ASSISTANT

## 2023-05-24 PROCEDURE — 85025 COMPLETE CBC W/AUTO DIFF WBC: CPT

## 2023-05-24 PROCEDURE — 6360000002 HC RX W HCPCS: Performed by: PHYSICIAN ASSISTANT

## 2023-05-24 PROCEDURE — 85652 RBC SED RATE AUTOMATED: CPT

## 2023-05-24 PROCEDURE — 2580000003 HC RX 258: Performed by: PHYSICIAN ASSISTANT

## 2023-05-24 PROCEDURE — 80048 BASIC METABOLIC PNL TOTAL CA: CPT

## 2023-05-24 RX ORDER — 0.9 % SODIUM CHLORIDE 0.9 %
1000 INTRAVENOUS SOLUTION INTRAVENOUS ONCE
Status: COMPLETED | OUTPATIENT
Start: 2023-05-24 | End: 2023-05-24

## 2023-05-24 RX ORDER — KETOROLAC TROMETHAMINE 30 MG/ML
15 INJECTION, SOLUTION INTRAMUSCULAR; INTRAVENOUS ONCE
Status: COMPLETED | OUTPATIENT
Start: 2023-05-24 | End: 2023-05-24

## 2023-05-24 RX ORDER — BUTALBITAL, ACETAMINOPHEN AND CAFFEINE 50; 325; 40 MG/1; MG/1; MG/1
2 TABLET ORAL ONCE
Status: COMPLETED | OUTPATIENT
Start: 2023-05-24 | End: 2023-05-24

## 2023-05-24 RX ORDER — BUTALBITAL, ACETAMINOPHEN AND CAFFEINE 50; 325; 40 MG/1; MG/1; MG/1
1 TABLET ORAL EVERY 8 HOURS PRN
Qty: 16 TABLET | Refills: 3 | Status: SHIPPED | OUTPATIENT
Start: 2023-05-24

## 2023-05-24 RX ADMIN — KETOROLAC TROMETHAMINE 15 MG: 30 INJECTION, SOLUTION INTRAMUSCULAR; INTRAVENOUS at 20:51

## 2023-05-24 RX ADMIN — BUTALBITAL, ACETAMINOPHEN, AND CAFFEINE 2 TABLET: 50; 325; 40 TABLET ORAL at 20:52

## 2023-05-24 RX ADMIN — SODIUM CHLORIDE 1000 ML: 9 INJECTION, SOLUTION INTRAVENOUS at 20:51

## 2023-05-24 ASSESSMENT — PAIN - FUNCTIONAL ASSESSMENT: PAIN_FUNCTIONAL_ASSESSMENT: 0-10

## 2023-05-24 ASSESSMENT — PAIN DESCRIPTION - LOCATION: LOCATION: HEAD

## 2023-05-24 ASSESSMENT — LIFESTYLE VARIABLES
HOW OFTEN DO YOU HAVE A DRINK CONTAINING ALCOHOL: NEVER
HOW MANY STANDARD DRINKS CONTAINING ALCOHOL DO YOU HAVE ON A TYPICAL DAY: PATIENT DOES NOT DRINK

## 2023-05-24 ASSESSMENT — PAIN DESCRIPTION - DESCRIPTORS: DESCRIPTORS: ACHING

## 2023-05-24 ASSESSMENT — PAIN SCALES - GENERAL
PAINLEVEL_OUTOF10: 7

## 2023-05-25 NOTE — DISCHARGE INSTRUCTIONS
I do believe this to be a spinal headache. In the emergency room I did give you Toradol 15 mg IV, Fioricet 2 tablets and NaCl 1 L. Her headache initially 7/10 at discharge and 2/10. I would recommend at home increase fluids, Motrin 600 mg 3 times a day and Fioricet 1 tablet 3 times a day for the next 3 to 4 days. Thereafter use this medication when needed. Follow-up with your healthcare provider on Friday.

## 2023-05-25 NOTE — ED PROVIDER NOTES
[address] [facility]        Pt Name: Ramseh Head  MRN: [id number]  [place] 1985  Date of evaluation: 2023  Provider: Camilla Lau PA-C  PCP: Cruz Molina MD  Note Started: 8:25 PM EDT 23      CHAN. I have evaluated this patient. CHIEF COMPLAINT       Chief Complaint   Patient presents with    Headache     Pt is post  on 5/15. Did have preeclampsia. Started with headache  that is unrelieved by any pain medication. Pt bp not extremely elevated in traige and has been normal at home. Went to urgent care and they sent her  here with concern of spinal fluid leak from spinal anesthesia given for emergency . Also c/o intermittent neck pain. HISTORY OF PRESENT ILLNESS: 1 or more Elements     History From: Patient    Limitations to history : None    Ramesh Head is a 43 y.o. female who presents to emergency department at request of Dr. Edilma Truong. This patient presenting with headache and concern for spinal headache. This patient had emergent  due to decreased fetal activity and preeclampsia last Monday or about 9 days ago. The child. Child is with father in car. This patient indicating become aware of headache on Wednesday or about 1 week ago. Headache worse when she stands improved with laying. She reports headache and 2-3 when supine and with standing 6-7/10. She went to the urgent care and they considered spinal headache and with the evaluation for blood patch. The patient had a procedure at [facility].  The patient does have follow-up on Alem 3, 2023. She reports no trauma. Reporting no fevers or chills. Reporting no back pain. No urinary complaints. No chest pain or shortness of breath. No gastrointestinal complaint. Nursing Notes were all reviewed and agreed with or any disagreements were addressed in the HPI.     REVIEW OF SYSTEMS :

## 2023-05-25 NOTE — ED NOTES
Provider order placed for patient's discharge. Provider reviewed decision to discharge with the patient. Discharge paperwork and any prescriptions were reviewed with the patient. Patient verbalized understanding of discharge education and any prescriptions and has no further questions or further needs at this time. Patient left with all personal belongings and was stable upon departure. Patient thanked for choosing [facility] ([facility]) and informed to return should any need arise.        Oziel Newman RN  05/24/23 1660

## (undated) DEVICE — Device

## (undated) DEVICE — 9165 UNIVERSAL PATIENT PLATE: Brand: 3M™

## (undated) DEVICE — SOLUTION IV IRRIG POUR BRL 0.9% SODIUM CHL 2F7124

## (undated) DEVICE — 3M™ MEDIPORE™ H SOFT CLOTH SURGICAL TAPE 2864, 4 INCH X 10 YARD (10CM X 9,14M), 12 ROLLS/CASE: Brand: 3M™ MEDIPORE™

## (undated) DEVICE — SUTURE VCRL SZ 0 L36IN ABSRB UD CT-1 L36MM 1/2 CIR TAPR PNT VCP946H

## (undated) DEVICE — GLOVE,SURG,SENSICARE SLT,LF,PF,6.5: Brand: MEDLINE

## (undated) DEVICE — TELFA NON-ADHERENT ABSORBENT DRESSING: Brand: TELFA

## (undated) DEVICE — PAD,ABDOMINAL,8"X7.5",STERILE,LF,1/PK: Brand: MEDLINE

## (undated) DEVICE — WOUND RETRACTOR AND PROTECTOR: Brand: ALEXIS WOUND PROTECTOR-RETRACTOR

## (undated) DEVICE — CANISTER, RIGID, 3000CC: Brand: MEDLINE INDUSTRIES, INC.

## (undated) DEVICE — SPONGE LAP W18XL18IN WHT COT 4 PLY FLD STRUNG RADPQ DISP ST 2 PER PACK

## (undated) DEVICE — CATHETER TRAY 16 FR 5 CC FOL ANTIREFLX SAMPLING PRT DOVER

## (undated) DEVICE — CHLORAPREP 26ML ORANGE

## (undated) DEVICE — SUTURE VCRL + SZ 2-0 L27IN ABSRB CLR CT-1 1/2 CIR TAPERCUT VCP259H

## (undated) DEVICE — COVER LT HNDL BLU PLAS

## (undated) DEVICE — SAFESECURE,SECUREMENT,FOLEY CATH,STERILE: Brand: MEDLINE

## (undated) DEVICE — SUTURE VCRL + SZ 0 L18IN ABSRB UD L36MM CT-1 1/2 CIR VCP840D

## (undated) DEVICE — GLOVE SURG SZ 7 L12IN FNGR THK79MIL GRN LTX FREE

## (undated) DEVICE — SUTURE VCRL SZ 4-0 L18IN ABSRB UD L19MM PS-2 3/8 CIR PRIM J496H

## (undated) DEVICE — BAG,SPONGE COUNTER,BLUE,50/BX,5BX/CS: Brand: MEDLINE

## (undated) DEVICE — GARMENT,MEDLINE,DVT,INT,CALF,LG, GEN2: Brand: MEDLINE